# Patient Record
Sex: FEMALE | Race: WHITE | HISPANIC OR LATINO | Employment: PART TIME | URBAN - METROPOLITAN AREA
[De-identification: names, ages, dates, MRNs, and addresses within clinical notes are randomized per-mention and may not be internally consistent; named-entity substitution may affect disease eponyms.]

---

## 2023-03-02 PROBLEM — K58.9 IRRITABLE BOWEL SYNDROME (IBS): Status: ACTIVE | Noted: 2023-03-02

## 2023-03-02 PROBLEM — E55.9 VITAMIN D DEFICIENCY: Status: ACTIVE | Noted: 2023-03-02

## 2023-03-02 PROBLEM — B96.89 BACTERIAL VAGINOSIS: Status: ACTIVE | Noted: 2023-03-02

## 2023-03-02 PROBLEM — F41.9 ANXIETY: Status: ACTIVE | Noted: 2023-03-02

## 2023-03-02 PROBLEM — N76.0 BACTERIAL VAGINOSIS: Status: ACTIVE | Noted: 2023-03-02

## 2023-03-02 PROBLEM — J30.9 ALLERGIC RHINITIS: Status: ACTIVE | Noted: 2023-03-02

## 2023-03-02 PROBLEM — N39.0 UTI (URINARY TRACT INFECTION): Status: ACTIVE | Noted: 2023-03-02

## 2023-03-02 PROBLEM — K21.9 GERD WITHOUT ESOPHAGITIS: Status: ACTIVE | Noted: 2023-03-02

## 2023-03-02 RX ORDER — MAGNESIUM 250 MG
1 TABLET ORAL DAILY
COMMUNITY
Start: 2020-12-04 | End: 2023-10-05

## 2023-03-02 RX ORDER — FAMOTIDINE 20 MG/1
1 TABLET, FILM COATED ORAL EVERY 12 HOURS
COMMUNITY
Start: 2021-12-09 | End: 2023-10-05

## 2023-03-02 RX ORDER — PAROXETINE 10 MG/1
1 TABLET, FILM COATED ORAL DAILY
COMMUNITY
Start: 2022-02-01 | End: 2023-03-08 | Stop reason: SINTOL

## 2023-03-06 ENCOUNTER — APPOINTMENT (OUTPATIENT)
Dept: PRIMARY CARE | Facility: CLINIC | Age: 28
End: 2023-03-06
Payer: COMMERCIAL

## 2023-03-08 ENCOUNTER — OFFICE VISIT (OUTPATIENT)
Dept: PRIMARY CARE | Facility: CLINIC | Age: 28
End: 2023-03-08
Payer: COMMERCIAL

## 2023-03-08 VITALS
DIASTOLIC BLOOD PRESSURE: 85 MMHG | SYSTOLIC BLOOD PRESSURE: 121 MMHG | WEIGHT: 260 LBS | OXYGEN SATURATION: 98 % | HEIGHT: 68 IN | BODY MASS INDEX: 39.4 KG/M2 | HEART RATE: 73 BPM

## 2023-03-08 DIAGNOSIS — F41.1 GENERALIZED ANXIETY DISORDER: Primary | ICD-10-CM

## 2023-03-08 DIAGNOSIS — E66.9 CLASS 2 OBESITY WITHOUT SERIOUS COMORBIDITY WITH BODY MASS INDEX (BMI) OF 39.0 TO 39.9 IN ADULT, UNSPECIFIED OBESITY TYPE: ICD-10-CM

## 2023-03-08 DIAGNOSIS — R63.5 WEIGHT GAIN: ICD-10-CM

## 2023-03-08 PROBLEM — N76.0 BACTERIAL VAGINOSIS: Status: RESOLVED | Noted: 2023-03-02 | Resolved: 2023-03-08

## 2023-03-08 PROBLEM — B96.89 BACTERIAL VAGINOSIS: Status: RESOLVED | Noted: 2023-03-02 | Resolved: 2023-03-08

## 2023-03-08 PROBLEM — N39.0 UTI (URINARY TRACT INFECTION): Status: RESOLVED | Noted: 2023-03-02 | Resolved: 2023-03-08

## 2023-03-08 PROCEDURE — 1036F TOBACCO NON-USER: CPT | Performed by: FAMILY MEDICINE

## 2023-03-08 PROCEDURE — 99214 OFFICE O/P EST MOD 30 MIN: CPT | Performed by: FAMILY MEDICINE

## 2023-03-08 PROCEDURE — 3008F BODY MASS INDEX DOCD: CPT | Performed by: FAMILY MEDICINE

## 2023-03-08 RX ORDER — FLUOXETINE 10 MG/1
10 CAPSULE ORAL DAILY
Qty: 30 CAPSULE | Refills: 1 | Status: SHIPPED | OUTPATIENT
Start: 2023-03-08 | End: 2023-05-01 | Stop reason: SDUPTHER

## 2023-03-08 ASSESSMENT — ENCOUNTER SYMPTOMS
FATIGUE: 0
SHORTNESS OF BREATH: 0
PALPITATIONS: 0
UNEXPECTED WEIGHT CHANGE: 1
ABDOMINAL PAIN: 0
NERVOUS/ANXIOUS: 1
SLEEP DISTURBANCE: 0
COUGH: 0

## 2023-03-08 NOTE — ASSESSMENT & PLAN NOTE
Will try switching the paroxetine to Prozac to see if that helps with her weight.  We can also use Wellbutrin or topiramate to help with appetite.  F/U 1 month for recheck.

## 2023-03-08 NOTE — PROGRESS NOTES
"Subjective   Patient ID: Ofe Tee is a 27 y.o. female who presents for Weight Check (Discuss weight gain and anxiety medication ).    Ofe is here because she is having trouble losing weight.  She is very active and has been attentive to her diet and still can't lose weight.  She feels it may be partly due to the medication she is taking.  She tried stopping the paroxetine and her anxiety got worse.  She resumed it and the symptoms got better again.  She has two small children that keep her very busy and she works full time as a .        Review of Systems   Constitutional:  Positive for unexpected weight change. Negative for fatigue.   Respiratory:  Negative for cough and shortness of breath.    Cardiovascular:  Negative for chest pain and palpitations.   Gastrointestinal:  Negative for abdominal pain.   Psychiatric/Behavioral:  Negative for sleep disturbance. The patient is nervous/anxious.        Objective     /85   Pulse 73   Ht 1.727 m (5' 8\")   Wt 118 kg (260 lb)   SpO2 98%   BMI 39.53 kg/m²     Physical Exam  Constitutional:       General: She is not in acute distress.     Appearance: She is obese.   Neck:      Thyroid: No thyromegaly.   Cardiovascular:      Rate and Rhythm: Normal rate and regular rhythm.      Heart sounds: No murmur heard.  Pulmonary:      Effort: No respiratory distress.      Breath sounds: Normal breath sounds.   Lymphadenopathy:      Cervical: No cervical adenopathy.   Neurological:      Mental Status: She is alert.   Psychiatric:         Mood and Affect: Mood normal.         Behavior: Behavior normal.             Assessment/Plan   Problem List Items Addressed This Visit          Other    Anxiety disorder - Primary    Current Assessment & Plan     Will try switching the paroxetine to Prozac to see if that helps with her weight.  We can also use Wellbutrin or topiramate to help with appetite.  F/U 1 month for recheck.         Relevant Medications    " FLUoxetine (PROzac) 10 mg capsule     Other Visit Diagnoses       Weight gain        Relevant Orders    TSH with reflex to Free T4 if abnormal    Comprehensive metabolic panel    Lipid Panel    Class 2 obesity without serious comorbidity with body mass index (BMI) of 39.0 to 39.9 in adult, unspecified obesity type

## 2023-03-09 ENCOUNTER — LAB (OUTPATIENT)
Dept: LAB | Facility: LAB | Age: 28
End: 2023-03-09
Payer: MEDICAID

## 2023-03-09 DIAGNOSIS — R63.5 WEIGHT GAIN: ICD-10-CM

## 2023-03-09 LAB
ALANINE AMINOTRANSFERASE (SGPT) (U/L) IN SER/PLAS: 29 U/L (ref 7–45)
ALBUMIN (G/DL) IN SER/PLAS: 3.9 G/DL (ref 3.4–5)
ALKALINE PHOSPHATASE (U/L) IN SER/PLAS: 40 U/L (ref 33–110)
ANION GAP IN SER/PLAS: 9 MMOL/L (ref 10–20)
ASPARTATE AMINOTRANSFERASE (SGOT) (U/L) IN SER/PLAS: 22 U/L (ref 9–39)
BILIRUBIN TOTAL (MG/DL) IN SER/PLAS: 0.3 MG/DL (ref 0–1.2)
CALCIUM (MG/DL) IN SER/PLAS: 9.2 MG/DL (ref 8.6–10.6)
CARBON DIOXIDE, TOTAL (MMOL/L) IN SER/PLAS: 30 MMOL/L (ref 21–32)
CHLORIDE (MMOL/L) IN SER/PLAS: 106 MMOL/L (ref 98–107)
CHOLESTEROL (MG/DL) IN SER/PLAS: 155 MG/DL (ref 0–199)
CHOLESTEROL IN HDL (MG/DL) IN SER/PLAS: 39.7 MG/DL
CHOLESTEROL/HDL RATIO: 3.9
CREATININE (MG/DL) IN SER/PLAS: 0.51 MG/DL (ref 0.5–1.05)
GFR FEMALE: >90 ML/MIN/1.73M2
GLUCOSE (MG/DL) IN SER/PLAS: 86 MG/DL (ref 74–99)
LDL: 95 MG/DL (ref 0–99)
POTASSIUM (MMOL/L) IN SER/PLAS: 4.4 MMOL/L (ref 3.5–5.3)
PROTEIN TOTAL: 6.5 G/DL (ref 6.4–8.2)
SODIUM (MMOL/L) IN SER/PLAS: 141 MMOL/L (ref 136–145)
THYROTROPIN (MIU/L) IN SER/PLAS BY DETECTION LIMIT <= 0.05 MIU/L: 1.37 MIU/L (ref 0.44–3.98)
TRIGLYCERIDE (MG/DL) IN SER/PLAS: 101 MG/DL (ref 0–149)
UREA NITROGEN (MG/DL) IN SER/PLAS: 13 MG/DL (ref 6–23)
VLDL: 20 MG/DL (ref 0–40)

## 2023-03-09 PROCEDURE — 36415 COLL VENOUS BLD VENIPUNCTURE: CPT

## 2023-03-09 PROCEDURE — 84443 ASSAY THYROID STIM HORMONE: CPT

## 2023-03-09 PROCEDURE — 80061 LIPID PANEL: CPT

## 2023-03-09 PROCEDURE — 80053 COMPREHEN METABOLIC PANEL: CPT

## 2023-04-18 ENCOUNTER — APPOINTMENT (OUTPATIENT)
Dept: PRIMARY CARE | Facility: CLINIC | Age: 28
End: 2023-04-18
Payer: COMMERCIAL

## 2023-05-01 ENCOUNTER — TELEPHONE (OUTPATIENT)
Dept: PRIMARY CARE | Facility: CLINIC | Age: 28
End: 2023-05-01
Payer: COMMERCIAL

## 2023-05-01 DIAGNOSIS — F41.1 GENERALIZED ANXIETY DISORDER: ICD-10-CM

## 2023-05-01 RX ORDER — FLUOXETINE 10 MG/1
10 CAPSULE ORAL DAILY
Qty: 30 CAPSULE | Refills: 0 | Status: SHIPPED | OUTPATIENT
Start: 2023-05-01 | End: 2023-05-08 | Stop reason: SDUPTHER

## 2023-05-01 NOTE — TELEPHONE ENCOUNTER
Patient has appointment scheduled for 5.04.23, but is out of Fluoxetine 10 MG. Can she have a 1 month supply sent to [facility] in [place]?

## 2023-05-04 ENCOUNTER — APPOINTMENT (OUTPATIENT)
Dept: PRIMARY CARE | Facility: CLINIC | Age: 28
End: 2023-05-04
Payer: COMMERCIAL

## 2023-05-08 ENCOUNTER — OFFICE VISIT (OUTPATIENT)
Dept: PRIMARY CARE | Facility: CLINIC | Age: 28
End: 2023-05-08
Payer: COMMERCIAL

## 2023-05-08 VITALS
TEMPERATURE: 95.9 F | WEIGHT: 263 LBS | BODY MASS INDEX: 39.86 KG/M2 | HEIGHT: 68 IN | HEART RATE: 80 BPM | SYSTOLIC BLOOD PRESSURE: 120 MMHG | OXYGEN SATURATION: 97 % | DIASTOLIC BLOOD PRESSURE: 79 MMHG

## 2023-05-08 DIAGNOSIS — F41.1 GENERALIZED ANXIETY DISORDER: Primary | ICD-10-CM

## 2023-05-08 DIAGNOSIS — E66.9 CLASS 2 OBESITY WITHOUT SERIOUS COMORBIDITY WITH BODY MASS INDEX (BMI) OF 39.0 TO 39.9 IN ADULT, UNSPECIFIED OBESITY TYPE: ICD-10-CM

## 2023-05-08 PROBLEM — E66.812 CLASS 2 OBESITY WITHOUT SERIOUS COMORBIDITY WITH BODY MASS INDEX (BMI) OF 39.0 TO 39.9 IN ADULT: Status: ACTIVE | Noted: 2023-05-08

## 2023-05-08 PROCEDURE — 99213 OFFICE O/P EST LOW 20 MIN: CPT | Performed by: FAMILY MEDICINE

## 2023-05-08 PROCEDURE — 3008F BODY MASS INDEX DOCD: CPT | Performed by: FAMILY MEDICINE

## 2023-05-08 PROCEDURE — 1036F TOBACCO NON-USER: CPT | Performed by: FAMILY MEDICINE

## 2023-05-08 RX ORDER — FLUOXETINE HYDROCHLORIDE 20 MG/1
20 CAPSULE ORAL DAILY
Qty: 30 CAPSULE | Refills: 1 | Status: SHIPPED | OUTPATIENT
Start: 2023-05-08 | End: 2023-06-07 | Stop reason: SDUPTHER

## 2023-05-08 ASSESSMENT — LIFESTYLE VARIABLES
SKIP TO QUESTIONS 9-10: 1
HOW OFTEN DO YOU HAVE A DRINK CONTAINING ALCOHOL: NEVER
HOW MANY STANDARD DRINKS CONTAINING ALCOHOL DO YOU HAVE ON A TYPICAL DAY: PATIENT DOES NOT DRINK
AUDIT-C TOTAL SCORE: 0
HOW OFTEN DO YOU HAVE SIX OR MORE DRINKS ON ONE OCCASION: NEVER

## 2023-05-08 ASSESSMENT — ENCOUNTER SYMPTOMS
ABDOMINAL PAIN: 0
PALPITATIONS: 0
SHORTNESS OF BREATH: 0
UNEXPECTED WEIGHT CHANGE: 0
COUGH: 0
FATIGUE: 0

## 2023-05-08 ASSESSMENT — PATIENT HEALTH QUESTIONNAIRE - PHQ9
1. LITTLE INTEREST OR PLEASURE IN DOING THINGS: NOT AT ALL
SUM OF ALL RESPONSES TO PHQ9 QUESTIONS 1 & 2: 0
2. FEELING DOWN, DEPRESSED OR HOPELESS: NOT AT ALL

## 2023-05-08 NOTE — ASSESSMENT & PLAN NOTE
Recommend she track her calories in [employer] and follow its recommendations for macronutrient and calorie intake.  Continue regular exercise.

## 2023-05-08 NOTE — PROGRESS NOTES
"Subjective   Patient ID: Ofe Tee is a 27 y.o. female who presents for Anxiety.    Ofe is here to follow up on her anxiety.  She reports her anxiety has been controlled, she did run out and her anxiety got worse.  She would be interested in trying a little higher dose.  She sleeps well.  She goes to the gym and walks on the treadmill at home.  She is trying to limit her carbohydrates and occasionally tracks her calories.          Review of Systems   Constitutional:  Negative for fatigue and unexpected weight change.   Respiratory:  Negative for cough and shortness of breath.    Cardiovascular:  Negative for chest pain and palpitations.   Gastrointestinal:  Negative for abdominal pain.       Objective     /79   Pulse 80   Temp 35.5 °C (95.9 °F)   Ht 1.727 m (5' 8\")   Wt 119 kg (263 lb)   SpO2 97%   BMI 39.99 kg/m²     Physical Exam  Constitutional:       General: She is not in acute distress.     Appearance: She is obese.   Neck:      Thyroid: No thyromegaly.   Cardiovascular:      Rate and Rhythm: Normal rate and regular rhythm.      Heart sounds: No murmur heard.  Pulmonary:      Effort: No respiratory distress.      Breath sounds: Normal breath sounds.   Lymphadenopathy:      Cervical: No cervical adenopathy.   Neurological:      Mental Status: She is alert.   Psychiatric:         Mood and Affect: Mood and affect normal.             Assessment/Plan   Problem List Items Addressed This Visit          Endocrine/Metabolic    Class 2 obesity without serious comorbidity with body mass index (BMI) of 39.0 to 39.9 in adult    Current Assessment & Plan     Recommend she track her calories in [employer] and follow its recommendations for macronutrient and calorie intake.  Continue regular exercise.            Other    Generalized anxiety disorder - Primary    Relevant Medications    FLUoxetine (PROzac) 20 mg capsule           "

## 2023-05-30 DIAGNOSIS — F41.1 GENERALIZED ANXIETY DISORDER: ICD-10-CM

## 2023-05-30 RX ORDER — FLUOXETINE HYDROCHLORIDE 20 MG/1
CAPSULE ORAL
Qty: 30 CAPSULE | Refills: 1 | OUTPATIENT
Start: 2023-05-30

## 2023-06-05 ENCOUNTER — APPOINTMENT (OUTPATIENT)
Dept: PRIMARY CARE | Facility: CLINIC | Age: 28
End: 2023-06-05
Payer: COMMERCIAL

## 2023-06-07 ENCOUNTER — OFFICE VISIT (OUTPATIENT)
Dept: PRIMARY CARE | Facility: CLINIC | Age: 28
End: 2023-06-07
Payer: COMMERCIAL

## 2023-06-07 VITALS
OXYGEN SATURATION: 98 % | SYSTOLIC BLOOD PRESSURE: 123 MMHG | WEIGHT: 263 LBS | HEART RATE: 78 BPM | HEIGHT: 68 IN | TEMPERATURE: 98 F | BODY MASS INDEX: 39.86 KG/M2 | DIASTOLIC BLOOD PRESSURE: 85 MMHG

## 2023-06-07 DIAGNOSIS — F41.1 GENERALIZED ANXIETY DISORDER: ICD-10-CM

## 2023-06-07 PROCEDURE — 99213 OFFICE O/P EST LOW 20 MIN: CPT | Performed by: FAMILY MEDICINE

## 2023-06-07 PROCEDURE — 1036F TOBACCO NON-USER: CPT | Performed by: FAMILY MEDICINE

## 2023-06-07 PROCEDURE — 3008F BODY MASS INDEX DOCD: CPT | Performed by: FAMILY MEDICINE

## 2023-06-07 RX ORDER — MULTIVITAMIN
1 TABLET ORAL DAILY
COMMUNITY

## 2023-06-07 RX ORDER — FLUOXETINE HYDROCHLORIDE 20 MG/1
20 CAPSULE ORAL DAILY
Qty: 90 CAPSULE | Refills: 1 | Status: SHIPPED | OUTPATIENT
Start: 2023-06-07 | End: 2023-12-11

## 2023-06-07 ASSESSMENT — ENCOUNTER SYMPTOMS
FATIGUE: 0
PALPITATIONS: 0
SHORTNESS OF BREATH: 0
UNEXPECTED WEIGHT CHANGE: 0
COUGH: 0
ABDOMINAL PAIN: 0

## 2023-06-07 ASSESSMENT — PATIENT HEALTH QUESTIONNAIRE - PHQ9
2. FEELING DOWN, DEPRESSED OR HOPELESS: NOT AT ALL
SUM OF ALL RESPONSES TO PHQ9 QUESTIONS 1 AND 2: 0
1. LITTLE INTEREST OR PLEASURE IN DOING THINGS: NOT AT ALL

## 2023-06-07 NOTE — PROGRESS NOTES
"Subjective   Patient ID: Ofe Tee is a 28 y.o. female who presents for Follow-up (On anxiety ).    Ofe is here to follow up on her anxiety.  She reports her anxiety has been better on the fluoxetine.  She felt a little more anxious when she increased the dose but it is better now.  Sleep is good.          Review of Systems   Constitutional:  Negative for fatigue and unexpected weight change.   Respiratory:  Negative for cough and shortness of breath.    Cardiovascular:  Negative for chest pain and palpitations.   Gastrointestinal:  Negative for abdominal pain.       Objective     /85   Pulse 78   Temp 36.7 °C (98 °F)   Ht 1.727 m (5' 8\")   Wt 119 kg (263 lb)   SpO2 98%   BMI 39.99 kg/m²     Physical Exam  Constitutional:       General: She is not in acute distress.     Appearance: She is obese.   Neck:      Thyroid: No thyromegaly.   Cardiovascular:      Rate and Rhythm: Normal rate and regular rhythm.      Heart sounds: No murmur heard.  Pulmonary:      Effort: No respiratory distress.      Breath sounds: Normal breath sounds.   Lymphadenopathy:      Cervical: No cervical adenopathy.   Neurological:      Mental Status: She is alert.   Psychiatric:         Mood and Affect: Mood and affect normal.             Assessment/Plan   Problem List Items Addressed This Visit          Other    Generalized anxiety disorder    Current Assessment & Plan     She is doing well, will continue the same medication.  F/U as needed.         Relevant Medications    FLUoxetine (PROzac) 20 mg capsule           "

## 2023-08-22 LAB
CLUE CELLS: ABNORMAL
NUGENT SCORE: 5
VAGINITIS-BV + YEAST INTERPRETATION: ABNORMAL
YEAST: ABNORMAL

## 2023-08-23 LAB — URINE CULTURE: NORMAL

## 2023-10-05 ENCOUNTER — INITIAL PRENATAL (OUTPATIENT)
Dept: OBSTETRICS AND GYNECOLOGY | Facility: CLINIC | Age: 28
End: 2023-10-05
Payer: COMMERCIAL

## 2023-10-05 VITALS — SYSTOLIC BLOOD PRESSURE: 110 MMHG | DIASTOLIC BLOOD PRESSURE: 76 MMHG | BODY MASS INDEX: 38.62 KG/M2 | WEIGHT: 254 LBS

## 2023-10-05 DIAGNOSIS — N91.2 AMENORRHEA: Primary | ICD-10-CM

## 2023-10-05 PROCEDURE — 87086 URINE CULTURE/COLONY COUNT: CPT

## 2023-10-05 PROCEDURE — 87491 CHLMYD TRACH DNA AMP PROBE: CPT

## 2023-10-05 PROCEDURE — 99213 OFFICE O/P EST LOW 20 MIN: CPT | Performed by: OBSTETRICS & GYNECOLOGY

## 2023-10-05 PROCEDURE — 87591 N.GONORRHOEAE DNA AMP PROB: CPT

## 2023-10-05 NOTE — PROGRESS NOTES
Subjective   Patient ID [id number]   Ofe Tee is a 28 y.o.  at 9w2d with a working estimated date of delivery of 2024, by Last Menstrual Period who presents for an initial prenatal visit. This pregnancy is unplanned.    Her pregnancy is complicated by:  Mild nausea    OB History    Para Term  AB Living   3 2 1 1   2   SAB IAB Ectopic Multiple Live Births           2      # Outcome Date GA Lbr [place]/2nd Weight Sex Delivery Anes PTL Lv   3 Current            2 Term 10/01/21   2835 g F Vag-Spont EPI N FELIX   1  04/15/20 36w6d  2750 g M Vag-Spont EPI Y FELIX      Birth Comments: PPROM, spontaneous labor.      Complications: History of  premature rupture of membranes (PPROM)          Objective   Physical Exam  Weight: 115 kg (254 lb)  Expected Total Weight Gain: Could not be calculated   Pregravid BMI: Could not be calculated  BP: 110/76    Fetal Heart Rate: 160 (approx. by u/s)   Office ultrasound notes a single, viable intrauterine pregnancy.  Crown-rump length is consistent with stated LMP.  Heart rate approximately 160 bpm noted.  OBGyn Exam    Prenatal Labs  ordered    Assessment/Plan     Pt with anxiety, feels well on fluoxetine 20 mg daily and plans to continue.  Immunizations: n/a  Prenatal Labs ordered  Daily prenatal vitamins prescribed  First trimester screening and second trimester screening discussed. Patient decided to proceed with Prequel after 10 weeks.  Follow up in 4 weeks for return OB visit.

## 2023-10-06 LAB
C TRACH RRNA SPEC QL NAA+PROBE: NEGATIVE
N GONORRHOEA DNA SPEC QL PROBE+SIG AMP: NEGATIVE

## 2023-10-07 LAB — BACTERIA UR CULT: NORMAL

## 2023-10-12 ENCOUNTER — LAB (OUTPATIENT)
Dept: LAB | Facility: LAB | Age: 28
End: 2023-10-12
Payer: COMMERCIAL

## 2023-10-12 DIAGNOSIS — N91.2 AMENORRHEA: ICD-10-CM

## 2023-10-12 LAB
25(OH)D3 SERPL-MCNC: 31 NG/ML (ref 30–100)
ABO GROUP (TYPE) IN BLOOD: NORMAL
ANTIBODY SCREEN: NORMAL
ERYTHROCYTE [DISTWIDTH] IN BLOOD BY AUTOMATED COUNT: 12.8 % (ref 11.5–14.5)
EST. AVERAGE GLUCOSE BLD GHB EST-MCNC: 97 MG/DL
HBA1C MFR BLD: 5 %
HBV SURFACE AG SERPL QL IA: NONREACTIVE
HCT VFR BLD AUTO: 37.7 % (ref 36–46)
HCV AB SER QL: NONREACTIVE
HGB BLD-MCNC: 12.3 G/DL (ref 12–16)
HIV 1+2 AB+HIV1 P24 AG SERPL QL IA: NONREACTIVE
MCH RBC QN AUTO: 29.9 PG (ref 26–34)
MCHC RBC AUTO-ENTMCNC: 32.6 G/DL (ref 32–36)
MCV RBC AUTO: 92 FL (ref 80–100)
NRBC BLD-RTO: 0 /100 WBCS (ref 0–0)
PLATELET # BLD AUTO: 232 X10*3/UL (ref 150–450)
PMV BLD AUTO: 10.7 FL (ref 7.5–11.5)
RBC # BLD AUTO: 4.12 X10*6/UL (ref 4–5.2)
RH FACTOR (ANTIGEN D): NORMAL
RUBV IGG SERPL IA-ACNC: 1.1 IA
RUBV IGG SERPL QL IA: POSITIVE
WBC # BLD AUTO: 5.2 X10*3/UL (ref 4.4–11.3)

## 2023-10-12 PROCEDURE — 85027 COMPLETE CBC AUTOMATED: CPT

## 2023-10-12 PROCEDURE — 87340 HEPATITIS B SURFACE AG IA: CPT

## 2023-10-12 PROCEDURE — 86901 BLOOD TYPING SEROLOGIC RH(D): CPT

## 2023-10-12 PROCEDURE — 86900 BLOOD TYPING SEROLOGIC ABO: CPT

## 2023-10-12 PROCEDURE — 82306 VITAMIN D 25 HYDROXY: CPT

## 2023-10-12 PROCEDURE — 86803 HEPATITIS C AB TEST: CPT

## 2023-10-12 PROCEDURE — 83036 HEMOGLOBIN GLYCOSYLATED A1C: CPT

## 2023-10-12 PROCEDURE — 86317 IMMUNOASSAY INFECTIOUS AGENT: CPT

## 2023-10-12 PROCEDURE — 87389 HIV-1 AG W/HIV-1&-2 AB AG IA: CPT

## 2023-10-12 PROCEDURE — 36415 COLL VENOUS BLD VENIPUNCTURE: CPT

## 2023-10-12 PROCEDURE — 86850 RBC ANTIBODY SCREEN: CPT

## 2023-10-26 LAB
ABO GROUP (TYPE) IN BLOOD: NORMAL
RH FACTOR (ANTIGEN D): NORMAL

## 2023-10-31 ENCOUNTER — TELEPHONE (OUTPATIENT)
Dept: OBSTETRICS AND GYNECOLOGY | Facility: CLINIC | Age: 28
End: 2023-10-31
Payer: COMMERCIAL

## 2023-10-31 DIAGNOSIS — R39.9 UTI SYMPTOMS: Primary | ICD-10-CM

## 2023-10-31 RX ORDER — NITROFURANTOIN 25; 75 MG/1; MG/1
100 CAPSULE ORAL 2 TIMES DAILY
Qty: 14 CAPSULE | Refills: 0 | Status: SHIPPED | OUTPATIENT
Start: 2023-10-31 | End: 2023-11-07

## 2023-10-31 NOTE — TELEPHONE ENCOUNTER
Talked to the patient.  She is 13 weeks pregnant.  She has urinary urgency and burning.  She does feel like her symptoms are consistent with a UTI.  I ordered nitrofurantoin to her pharmacy.  She will call me if her symptoms or not improving in 24 to 36 hours.

## 2023-11-03 ENCOUNTER — ANCILLARY PROCEDURE (OUTPATIENT)
Dept: RADIOLOGY | Facility: CLINIC | Age: 28
End: 2023-11-03
Payer: COMMERCIAL

## 2023-11-03 DIAGNOSIS — Z34.90 PREGNANT (HHS-HCC): ICD-10-CM

## 2023-11-03 PROCEDURE — 76801 OB US < 14 WKS SINGLE FETUS: CPT

## 2023-11-03 PROCEDURE — 76813 OB US NUCHAL MEAS 1 GEST: CPT

## 2023-11-03 PROCEDURE — 76813 OB US NUCHAL MEAS 1 GEST: CPT | Performed by: STUDENT IN AN ORGANIZED HEALTH CARE EDUCATION/TRAINING PROGRAM

## 2023-11-03 PROCEDURE — 76801 OB US < 14 WKS SINGLE FETUS: CPT | Performed by: STUDENT IN AN ORGANIZED HEALTH CARE EDUCATION/TRAINING PROGRAM

## 2023-11-22 ENCOUNTER — APPOINTMENT (OUTPATIENT)
Dept: OBSTETRICS AND GYNECOLOGY | Facility: CLINIC | Age: 28
End: 2023-11-22
Payer: COMMERCIAL

## 2023-12-01 ENCOUNTER — APPOINTMENT (OUTPATIENT)
Dept: OBSTETRICS AND GYNECOLOGY | Facility: CLINIC | Age: 28
End: 2023-12-01
Payer: COMMERCIAL

## 2023-12-04 ENCOUNTER — ROUTINE PRENATAL (OUTPATIENT)
Dept: OBSTETRICS AND GYNECOLOGY | Facility: CLINIC | Age: 28
End: 2023-12-04
Payer: COMMERCIAL

## 2023-12-04 VITALS — BODY MASS INDEX: 38.47 KG/M2 | WEIGHT: 253 LBS | DIASTOLIC BLOOD PRESSURE: 67 MMHG | SYSTOLIC BLOOD PRESSURE: 123 MMHG

## 2023-12-04 DIAGNOSIS — Z34.92 SECOND TRIMESTER PREGNANCY (HHS-HCC): Primary | ICD-10-CM

## 2023-12-04 PROCEDURE — 0501F PRENATAL FLOW SHEET: CPT | Performed by: OBSTETRICS & GYNECOLOGY

## 2023-12-04 NOTE — PROGRESS NOTES
"Subjective   Patient ID [id number]   Ofe Tee is a 28 y.o.  at 17w6d with a working estimated date of delivery of 2024, by Last Menstrual Period who presents for a routine prenatal visit. She denies vaginal bleeding, leakage of fluid, decreased fetal movements, or contractions.    Her pregnancy is complicated by:  Anxiety, on fluoxetine.  History of vaginal delivery at 36.6 weeks after P PROM and spontaneous labor in 2020.  Followed by a term uncomplicated delivery in 2021.    Objective   Physical Exam NWJ=092 bpm.  Weight: 115 kg (253 lb)  Expected Total Weight Gain: Could not be calculated   Pregravid BMI: Could not be calculated  BP: 123/67         Prenatal Labs  Urine dip:  Lab Results   Component Value Date    KETONESU NEGATIVE 2021       Lab Results   Component Value Date    HGB 12.3 10/12/2023    HCT 37.7 10/12/2023    ABO O 10/12/2023    ABO O 10/12/2023    HEPBSAG Nonreactive 10/12/2023     No results found for: \"PAPPA\", \"AFP\", \"HCG\", \"ESTRIOL\", \"INHBA\"  No results found for: \"GLUF\", \"GLUT1\", \"HBVUBLP9LR\", \"RNCYRDX9EN\"    Imaging  The most recent ultrasound was performed on 11/3/23  with a study GA of 13.3  and EFW of 24 .          Assessment/Plan       Continue prenatal vitamin.  Labs reviewed.  Rhogam none  GTT at 28 weeks.  28-year-old at 17.6 weeks.  Her prequel was normal boy.  She does note that she is still quite queasy.  She has good fetal movements.  She has anatomy ultrasound scheduled for .  Follow up in 4 weeks for a routine prenatal visit.  "

## 2023-12-10 DIAGNOSIS — F41.1 GENERALIZED ANXIETY DISORDER: ICD-10-CM

## 2023-12-11 RX ORDER — FLUOXETINE HYDROCHLORIDE 20 MG/1
20 CAPSULE ORAL DAILY
Qty: 90 CAPSULE | Refills: 1 | Status: SHIPPED | OUTPATIENT
Start: 2023-12-11 | End: 2024-06-08

## 2023-12-18 ENCOUNTER — ANCILLARY PROCEDURE (OUTPATIENT)
Dept: RADIOLOGY | Facility: CLINIC | Age: 28
End: 2023-12-18
Payer: COMMERCIAL

## 2023-12-18 DIAGNOSIS — Z36.89 SCREENING, ANTENATAL, FOR FETAL ANATOMIC SURVEY (HHS-HCC): ICD-10-CM

## 2023-12-18 PROCEDURE — 76811 OB US DETAILED SNGL FETUS: CPT | Performed by: OBSTETRICS & GYNECOLOGY

## 2023-12-18 PROCEDURE — 76811 OB US DETAILED SNGL FETUS: CPT

## 2023-12-19 ENCOUNTER — TELEPHONE (OUTPATIENT)
Dept: OBSTETRICS AND GYNECOLOGY | Facility: HOSPITAL | Age: 28
End: 2023-12-19
Payer: COMMERCIAL

## 2023-12-26 ENCOUNTER — TELEPHONE (OUTPATIENT)
Dept: OBSTETRICS AND GYNECOLOGY | Facility: HOSPITAL | Age: 28
End: 2023-12-26
Payer: COMMERCIAL

## 2024-01-02 ENCOUNTER — APPOINTMENT (OUTPATIENT)
Dept: OBSTETRICS AND GYNECOLOGY | Facility: CLINIC | Age: 29
End: 2024-01-02
Payer: COMMERCIAL

## 2024-01-08 ENCOUNTER — APPOINTMENT (OUTPATIENT)
Dept: RADIOLOGY | Facility: CLINIC | Age: 29
End: 2024-01-08
Payer: COMMERCIAL

## 2024-01-09 ENCOUNTER — TELEPHONE (OUTPATIENT)
Dept: OBSTETRICS AND GYNECOLOGY | Facility: HOSPITAL | Age: 29
End: 2024-01-09
Payer: COMMERCIAL

## 2024-01-10 ENCOUNTER — ANCILLARY PROCEDURE (OUTPATIENT)
Dept: RADIOLOGY | Facility: CLINIC | Age: 29
End: 2024-01-10
Payer: COMMERCIAL

## 2024-01-10 DIAGNOSIS — Z34.90 PREGNANT (HHS-HCC): ICD-10-CM

## 2024-01-10 PROCEDURE — 76816 OB US FOLLOW-UP PER FETUS: CPT | Performed by: OBSTETRICS & GYNECOLOGY

## 2024-01-10 PROCEDURE — 76816 OB US FOLLOW-UP PER FETUS: CPT

## 2024-02-14 ENCOUNTER — ROUTINE PRENATAL (OUTPATIENT)
Dept: OBSTETRICS AND GYNECOLOGY | Facility: CLINIC | Age: 29
End: 2024-02-14
Payer: COMMERCIAL

## 2024-02-14 VITALS — DIASTOLIC BLOOD PRESSURE: 70 MMHG | BODY MASS INDEX: 39.68 KG/M2 | SYSTOLIC BLOOD PRESSURE: 122 MMHG | WEIGHT: 261 LBS

## 2024-02-14 DIAGNOSIS — Z3A.28 28 WEEKS GESTATION OF PREGNANCY (HHS-HCC): Primary | ICD-10-CM

## 2024-02-14 DIAGNOSIS — R39.9 UTI SYMPTOMS: ICD-10-CM

## 2024-02-14 DIAGNOSIS — Z34.93 THIRD TRIMESTER PREGNANCY (HHS-HCC): ICD-10-CM

## 2024-02-14 PROCEDURE — 87086 URINE CULTURE/COLONY COUNT: CPT

## 2024-02-14 PROCEDURE — 0501F PRENATAL FLOW SHEET: CPT | Performed by: OBSTETRICS & GYNECOLOGY

## 2024-02-14 RX ORDER — NITROFURANTOIN 25; 75 MG/1; MG/1
100 CAPSULE ORAL 2 TIMES DAILY
Qty: 14 CAPSULE | Refills: 0 | Status: SHIPPED | OUTPATIENT
Start: 2024-02-14 | End: 2024-02-21

## 2024-02-14 NOTE — PROGRESS NOTES
"Subjective   Patient ID [id number]   Ofe Tee is a 28 y.o.  at 28w1d with a working estimated date of delivery of 2024, by Last Menstrual Period who presents for a routine prenatal visit. She denies vaginal bleeding, leakage of fluid, decreased fetal movements, or contractions.    Her last visit here was in 2023 at 17.6 weeks.  She comes today with a concern of \"pressure\".  There may be dysuria.  The office urinalysis does note leukocytes and blood in the urine.  She denies vaginal bleeding or rain hematuria.  She has no fevers or chills.   Her pregnancy is complicated by:  H/O PPROM at 36.6 weeks 2020.  Needs fetal ECHO due to poor resolution.  Objective   Physical Exam: VE= posterior/closed/ long/-3  Weight: 118 kg (261 lb)  Expected Total Weight Gain: Could not be calculated   Pregravid BMI: Could not be calculated  BP: 122/70       UA all neg except leuks 1+, trace blood.           Prenatal Labs  Urine Dip:  Lab Results   Component Value Date    KETONESU NEGATIVE 2021     Lab Results   Component Value Date    HGB 12.3 10/12/2023    HCT 37.7 10/12/2023    ABO O 10/12/2023    ABO O 10/12/2023    HEPBSAG Nonreactive 10/12/2023     No results found for: \"PAPPA\", \"AFP\", \"HCG\", \"ESTRIOL\", \"INHBA\"  No results found for: \"GLUF\", \"GLUT1\", \"CHYJKHO5AE\", \"[id number]\"    Imaging  The most recent ultrasound was performed on 1/10/24  with a study GA of 23.1  and EFW of 61%tile. .          Assessment/Plan     Continue prenatal vitamin.  Labs reviewed.  There is no evidence of labor, the cervix is closed.  The office urinalysis is suspicious for UTI, and urine culture was sent.  I recommend beginning Macrobid empirically and this was ordered.  We did discuss obtaining 28-week labs to include Glucola, CBC, vitamin D, TSH and syphilis as soon as possible.    She is also due for a fetal ECHO, and I recommend calling [phone] to schedule.  GBS later.  Expected mode of delivery   Follow up in " 2 weeks for a routine prenatal visit.

## 2024-02-15 LAB — BACTERIA UR CULT: NORMAL

## 2024-02-16 ENCOUNTER — APPOINTMENT (OUTPATIENT)
Dept: OBSTETRICS AND GYNECOLOGY | Facility: CLINIC | Age: 29
End: 2024-02-16
Payer: COMMERCIAL

## 2024-02-19 ENCOUNTER — LAB (OUTPATIENT)
Dept: LAB | Facility: LAB | Age: 29
End: 2024-02-19
Payer: COMMERCIAL

## 2024-02-19 DIAGNOSIS — O99.810 ABNORMAL GLUCOSE TOLERANCE IN PREGNANCY (HHS-HCC): Primary | ICD-10-CM

## 2024-02-19 DIAGNOSIS — Z3A.28 28 WEEKS GESTATION OF PREGNANCY (HHS-HCC): ICD-10-CM

## 2024-02-19 LAB
25(OH)D3 SERPL-MCNC: 32 NG/ML (ref 30–100)
ERYTHROCYTE [DISTWIDTH] IN BLOOD BY AUTOMATED COUNT: 13.2 % (ref 11.5–14.5)
GLUCOSE 1H P 50 G GLC PO SERPL-MCNC: 147 MG/DL
HCT VFR BLD AUTO: 35.4 % (ref 36–46)
HGB BLD-MCNC: 11.5 G/DL (ref 12–16)
MCH RBC QN AUTO: 30.3 PG (ref 26–34)
MCHC RBC AUTO-ENTMCNC: 32.5 G/DL (ref 32–36)
MCV RBC AUTO: 93 FL (ref 80–100)
NRBC BLD-RTO: 0 /100 WBCS (ref 0–0)
PLATELET # BLD AUTO: 208 X10*3/UL (ref 150–450)
RBC # BLD AUTO: 3.8 X10*6/UL (ref 4–5.2)
REFLEX ADDED, ANEMIA PANEL: NORMAL
TREPONEMA PALLIDUM IGG+IGM AB [PRESENCE] IN SERUM OR PLASMA BY IMMUNOASSAY: NONREACTIVE
TSH SERPL-ACNC: 1.52 MIU/L (ref 0.44–3.98)
WBC # BLD AUTO: 9.1 X10*3/UL (ref 4.4–11.3)

## 2024-02-19 PROCEDURE — 85027 COMPLETE CBC AUTOMATED: CPT

## 2024-02-19 PROCEDURE — 84443 ASSAY THYROID STIM HORMONE: CPT

## 2024-02-19 PROCEDURE — 82947 ASSAY GLUCOSE BLOOD QUANT: CPT

## 2024-02-19 PROCEDURE — 36415 COLL VENOUS BLD VENIPUNCTURE: CPT

## 2024-02-19 PROCEDURE — 86780 TREPONEMA PALLIDUM: CPT

## 2024-02-19 PROCEDURE — 82306 VITAMIN D 25 HYDROXY: CPT

## 2024-02-26 ENCOUNTER — LAB (OUTPATIENT)
Dept: LAB | Facility: LAB | Age: 29
End: 2024-02-26
Payer: COMMERCIAL

## 2024-02-26 DIAGNOSIS — O99.810 ABNORMAL GLUCOSE TOLERANCE IN PREGNANCY (HHS-HCC): ICD-10-CM

## 2024-02-26 LAB
GLUCOSE 1H P 100 G GLC PO SERPL-MCNC: 153 MG/DL
GLUCOSE 2H P 100 G GLC PO SERPL-MCNC: 110 MG/DL
GLUCOSE 3H P 100 G GLC PO SERPL-MCNC: 75 MG/DL
GLUCOSE P FAST SERPL-MCNC: 70 MG/DL

## 2024-02-26 PROCEDURE — 82947 ASSAY GLUCOSE BLOOD QUANT: CPT

## 2024-02-26 PROCEDURE — 36415 COLL VENOUS BLD VENIPUNCTURE: CPT

## 2024-02-26 PROCEDURE — 82952 GTT-ADDED SAMPLES: CPT

## 2024-02-26 PROCEDURE — 82950 GLUCOSE TEST: CPT

## 2024-02-28 ENCOUNTER — ROUTINE PRENATAL (OUTPATIENT)
Dept: OBSTETRICS AND GYNECOLOGY | Facility: CLINIC | Age: 29
End: 2024-02-28
Payer: COMMERCIAL

## 2024-02-28 ENCOUNTER — HOSPITAL ENCOUNTER (OUTPATIENT)
Dept: RADIOLOGY | Facility: CLINIC | Age: 29
Discharge: HOME | End: 2024-02-28
Payer: COMMERCIAL

## 2024-02-28 VITALS — DIASTOLIC BLOOD PRESSURE: 70 MMHG | BODY MASS INDEX: 39.99 KG/M2 | WEIGHT: 263 LBS | SYSTOLIC BLOOD PRESSURE: 120 MMHG

## 2024-02-28 DIAGNOSIS — Z34.93 THIRD TRIMESTER PREGNANCY (HHS-HCC): ICD-10-CM

## 2024-02-28 DIAGNOSIS — O99.213 OBESITY COMPLICATING PREGNANCY, THIRD TRIMESTER (HHS-HCC): ICD-10-CM

## 2024-02-28 DIAGNOSIS — N91.2 AMENORRHEA: ICD-10-CM

## 2024-02-28 DIAGNOSIS — Z3A.30 30 WEEKS GESTATION OF PREGNANCY (HHS-HCC): Primary | ICD-10-CM

## 2024-02-28 PROCEDURE — 76819 FETAL BIOPHYS PROFIL W/O NST: CPT

## 2024-02-28 PROCEDURE — 76816 OB US FOLLOW-UP PER FETUS: CPT

## 2024-02-28 PROCEDURE — 76816 OB US FOLLOW-UP PER FETUS: CPT | Performed by: OBSTETRICS & GYNECOLOGY

## 2024-02-28 PROCEDURE — 76819 FETAL BIOPHYS PROFIL W/O NST: CPT | Performed by: OBSTETRICS & GYNECOLOGY

## 2024-02-28 PROCEDURE — 0501F PRENATAL FLOW SHEET: CPT | Performed by: OBSTETRICS & GYNECOLOGY

## 2024-02-28 NOTE — PROGRESS NOTES
"Subjective   Patient ID [id number]   Ofe Tee is a 28 y.o.  at 30w1d with a working estimated date of delivery of 2024, by Last Menstrual Period who presents for a routine prenatal visit. She denies vaginal bleeding, leakage of fluid, decreased fetal movements, or contractions.    Her pregnancy is complicated by:  Elevated 1 hour Glucola (147), normal 3-hour Glucola (70/153/110/75).  S/p UTI  sx on 2023, resolved with Macrobid.    Objective   Physical Exam:   Weight: 119 kg (263 lb)  Expected Total Weight Gain: Could not be calculated   Pregravid BMI: Could not be calculated  BP: 120/70                  Prenatal Labs  Urine Dip:  Lab Results   Component Value Date    KETONESU NEGATIVE 2021     Lab Results   Component Value Date    HGB 11.5 (L) 2024    HCT 35.4 (L) 2024    ABO O 10/12/2023    ABO O 10/12/2023    HEPBSAG Nonreactive 10/12/2023     No results found for: \"PAPPA\", \"AFP\", \"HCG\", \"ESTRIOL\", \"INHBA\"  No results found for: \"GLUF\", \"GLUT1\", \"NKPPSGH5MZ\", \"[id number]\"    Imaging  The most recent ultrasound was performed on24 The most recent ultrasound study is not finalized with a study GA of  30.1The most recent ultrasound study is not finalized and EFW of  50%tile.The most recent ultrasound study is not finalized.  The most recent ultrasound study is not finalized  The most recent ultrasound study is not finalized    Assessment/Plan     Continue prenatal vitamin.  Labs reviewed.  Expected mode of delivery .  Follow up in 2 week sfor a routine prenatal visit.  "

## 2024-03-13 ENCOUNTER — APPOINTMENT (OUTPATIENT)
Dept: OBSTETRICS AND GYNECOLOGY | Facility: CLINIC | Age: 29
End: 2024-03-13
Payer: COMMERCIAL

## 2024-03-15 ENCOUNTER — ROUTINE PRENATAL (OUTPATIENT)
Dept: OBSTETRICS AND GYNECOLOGY | Facility: CLINIC | Age: 29
End: 2024-03-15
Payer: COMMERCIAL

## 2024-03-15 VITALS — SYSTOLIC BLOOD PRESSURE: 110 MMHG | DIASTOLIC BLOOD PRESSURE: 74 MMHG | WEIGHT: 268 LBS | BODY MASS INDEX: 40.75 KG/M2

## 2024-03-15 DIAGNOSIS — Z3A.32 32 WEEKS GESTATION OF PREGNANCY (HHS-HCC): Primary | ICD-10-CM

## 2024-03-15 DIAGNOSIS — Z34.93 THIRD TRIMESTER PREGNANCY (HHS-HCC): ICD-10-CM

## 2024-03-15 PROCEDURE — 87086 URINE CULTURE/COLONY COUNT: CPT

## 2024-03-15 PROCEDURE — 0501F PRENATAL FLOW SHEET: CPT | Performed by: OBSTETRICS & GYNECOLOGY

## 2024-03-15 RX ORDER — FERROUS GLUCONATE 256(28)MG
28 TABLET ORAL DAILY
COMMUNITY

## 2024-03-15 NOTE — PROGRESS NOTES
"Subjective   Patient ID [id number]   Ofe Tee is a 28 y.o.  at 32w3d with a working estimated date of delivery of 2024, by Last Menstrual Period who presents for a routine prenatal visit. She denies vaginal bleeding, leakage of fluid, decreased fetal movements, or contractions.    Her pregnancy is complicated by:  History of P PROM at 36.6 weeks in 2020.  History of UTI 2024.    Objective   Physical Exam:   Weight: 122 kg (268 lb)  Expected Total Weight Gain: Could not be calculated   Pregravid BMI: Could not be calculated  BP: 110/74                  Prenatal Labs  Urine Dip:  Lab Results   Component Value Date    KETONESU NEGATIVE 2021     Lab Results   Component Value Date    HGB 11.5 (L) 2024    HCT 35.4 (L) 2024    ABO O 10/12/2023    ABO O 10/12/2023    HEPBSAG Nonreactive 10/12/2023     No results found for: \"PAPPA\", \"AFP\", \"HCG\", \"ESTRIOL\", \"INHBA\"  No results found for: \"GLUF\", \"GLUT1\", \"JMBJJPZ3RS\", \"VSPXQUR7GU\"    Imaging  The most recent ultrasound was performed on  24The most recent ultrasound study is not finalized with a study GA of  30.1The most recent ultrasound study is not finalized and EFW of  50%tile.The most recent ultrasound study is not finalized.  The most recent ultrasound study is not finalized  The most recent ultrasound study is not finalized    Assessment/Plan     Continue prenatal vitamin.  Check urine culture.  Expected mode of delivery   Follow up in 2 week for a routine prenatal visit.  "

## 2024-03-17 LAB — BACTERIA UR CULT: NORMAL

## 2024-03-27 ENCOUNTER — ROUTINE PRENATAL (OUTPATIENT)
Dept: OBSTETRICS AND GYNECOLOGY | Facility: CLINIC | Age: 29
End: 2024-03-27
Payer: COMMERCIAL

## 2024-03-27 VITALS — BODY MASS INDEX: 40.14 KG/M2 | SYSTOLIC BLOOD PRESSURE: 100 MMHG | WEIGHT: 264 LBS | DIASTOLIC BLOOD PRESSURE: 68 MMHG

## 2024-03-27 DIAGNOSIS — Z34.93 THIRD TRIMESTER PREGNANCY (HHS-HCC): ICD-10-CM

## 2024-03-27 DIAGNOSIS — Z3A.34 34 WEEKS GESTATION OF PREGNANCY (HHS-HCC): Primary | ICD-10-CM

## 2024-03-27 PROCEDURE — 0501F PRENATAL FLOW SHEET: CPT | Performed by: OBSTETRICS & GYNECOLOGY

## 2024-03-27 NOTE — PROGRESS NOTES
"Subjective   Patient ID [id number]   Ofe Tee is a 28 y.o.  at 34w1d with a working estimated date of delivery of 2024, by Last Menstrual Period who presents for a routine prenatal visit. She denies vaginal bleeding, leakage of fluid, decreased fetal movements, or contractions.    Her pregnancy is complicated by:  History of  delivery after P PROM at 36.6 weeks in 2020, followed by 37-week delivery in 2021.  Elevated 1 hour, normal 3-hour Glucola.    Objective   Physical Exam: tpl=003  Weight: 120 kg (264 lb)  Expected Total Weight Gain: Could not be calculated   Pregravid BMI: Could not be calculated  BP: 100/68                  Prenatal Labs  Urine Dip:  Lab Results   Component Value Date    KETONESU NEGATIVE 2021     Lab Results   Component Value Date    HGB 11.5 (L) 2024    HCT 35.4 (L) 2024    ABO O 10/12/2023    ABO O 10/12/2023    HEPBSAG Nonreactive 10/12/2023     No results found for: \"PAPPA\", \"AFP\", \"HCG\", \"ESTRIOL\", \"INHBA\"  No results found for: \"GLUF\", \"GLUT1\", \"PBGUJOD8BQ\", \"[id number]\"    Imaging  The most recent ultrasound was performed on 24The most recent ultrasound study is not finalized with a study GA of 30.1 The most recent ultrasound study is not finalized and EFW of  50%tile.The most recent ultrasound study is not finalized.  The most recent ultrasound study is not finalized  The most recent ultrasound study is not finalized    Assessment/Plan     Continue prenatal vitamin.  Labs reviewed.  Next ultrasound is scheduled April 10, 2024.  GBS next visit..  Expected mode of delivery  at [facility].  Follow up in 1 week for a routine prenatal visit.  "

## 2024-04-03 ENCOUNTER — ROUTINE PRENATAL (OUTPATIENT)
Dept: OBSTETRICS AND GYNECOLOGY | Facility: CLINIC | Age: 29
End: 2024-04-03
Payer: COMMERCIAL

## 2024-04-03 VITALS — DIASTOLIC BLOOD PRESSURE: 70 MMHG | SYSTOLIC BLOOD PRESSURE: 100 MMHG | BODY MASS INDEX: 40.6 KG/M2 | WEIGHT: 267 LBS

## 2024-04-03 DIAGNOSIS — Z34.93 THIRD TRIMESTER PREGNANCY (HHS-HCC): Primary | ICD-10-CM

## 2024-04-03 DIAGNOSIS — Z3A.35 35 WEEKS GESTATION OF PREGNANCY (HHS-HCC): ICD-10-CM

## 2024-04-03 PROCEDURE — 87081 CULTURE SCREEN ONLY: CPT

## 2024-04-03 PROCEDURE — 0501F PRENATAL FLOW SHEET: CPT | Performed by: OBSTETRICS & GYNECOLOGY

## 2024-04-03 NOTE — PROGRESS NOTES
"Subjective   Patient ID [id number]   Ofe Tee is a 28 y.o.  at 35w1d with a working estimated date of delivery of 2024, by Last Menstrual Period who presents for a routine prenatal visit. She denies vaginal bleeding, leakage of fluid, decreased fetal movements, or contractions.    Her pregnancy is complicated by:  Elevated 1 hour, normal 3-hour Glucola.  On fluoxetine for depression/anxiety.  History of P PROM  at 36.6 weeks in 2020.    Objective   Physical Exam: VE /3  Weight: 121 kg (267 lb)  Expected Total Weight Gain: Could not be calculated   Pregravid BMI: Could not be calculated  BP: 100/70                  Prenatal Labs  Urine Dip:  Lab Results   Component Value Date    KETONESU NEGATIVE 2021     Lab Results   Component Value Date    HGB 11.5 (L) 2024    HCT 35.4 (L) 2024    ABO O 10/12/2023    ABO O 10/12/2023    HEPBSAG Nonreactive 10/12/2023     No results found for: \"PAPPA\", \"AFP\", \"HCG\", \"ESTRIOL\", \"INHBA\"  No results found for: \"GLUF\", \"GLUT1\", \"UUYELHK3HI\", \"[id number]\"    Imaging  The most recent ultrasound was performed on  24The most recent ultrasound study is not finalized with a study GA of 30.1 The most recent ultrasound study is not finalized and EFW of  50%tile.The most recent ultrasound study is not finalized.  The most recent ultrasound study is not finalized  The most recent ultrasound study is not finalized    Assessment/Plan     Continue prenatal vitamin.  Labs reviewed.  Next ultrasound scheduled April 10, 2024.  GBS taken today.  Expected mode of delivery  at [facility].  Follow up in 1 week for a routine prenatal visit.  "

## 2024-04-06 LAB — GP B STREP GENITAL QL CULT: NORMAL

## 2024-04-10 ENCOUNTER — HOSPITAL ENCOUNTER (OUTPATIENT)
Dept: RADIOLOGY | Facility: CLINIC | Age: 29
Discharge: HOME | End: 2024-04-10
Payer: COMMERCIAL

## 2024-04-10 ENCOUNTER — ROUTINE PRENATAL (OUTPATIENT)
Dept: OBSTETRICS AND GYNECOLOGY | Facility: CLINIC | Age: 29
End: 2024-04-10
Payer: COMMERCIAL

## 2024-04-10 VITALS — SYSTOLIC BLOOD PRESSURE: 118 MMHG | BODY MASS INDEX: 40.75 KG/M2 | WEIGHT: 268 LBS | DIASTOLIC BLOOD PRESSURE: 70 MMHG

## 2024-04-10 DIAGNOSIS — N91.2 AMENORRHEA: ICD-10-CM

## 2024-04-10 DIAGNOSIS — Z34.93 THIRD TRIMESTER PREGNANCY (HHS-HCC): ICD-10-CM

## 2024-04-10 DIAGNOSIS — Z3A.36 36 WEEKS GESTATION OF PREGNANCY (HHS-HCC): Primary | ICD-10-CM

## 2024-04-10 PROCEDURE — 0501F PRENATAL FLOW SHEET: CPT | Performed by: OBSTETRICS & GYNECOLOGY

## 2024-04-10 NOTE — PROGRESS NOTES
"Subjective   Patient ID [id number]   Ofe Tee is a 28 y.o.  at 36w1d with a working estimated date of delivery of 2024, by Last Menstrual Period who presents for a routine prenatal visit. She denies vaginal bleeding, leakage of fluid, decreased fetal movements, or contractions.    Her pregnancy is complicated by:  History of P PROM at 36.6 weeks in 2020.  Elevated 1 hour, normal 3-hour Glucola, BMI=40.75    Objective   Physical Exam: VE /3  Weight: 122 kg (268 lb)  Expected Total Weight Gain: Could not be calculated   Pregravid BMI: Could not be calculated  BP: 118/70                  Prenatal Labs  Urine Dip:  Lab Results   Component Value Date    KETONESU NEGATIVE 2021     Lab Results   Component Value Date    HGB 11.5 (L) 2024    HCT 35.4 (L) 2024    ABO O 10/12/2023    ABO O 10/12/2023    HEPBSAG Nonreactive 10/12/2023     No results found for: \"PAPPA\", \"AFP\", \"HCG\", \"ESTRIOL\", \"INHBA\"  No results found for: \"GLUF\", \"GLUT1\", \"GXJNHTB4VF\", \"[id number]\"    Imaging  The most recent ultrasound was performed on 24 The most recent ultrasound study is not finalized with a study GA of  30.1The most recent ultrasound study is not finalized and EFW of 61%tile. The most recent ultrasound study is not finalized.  The most recent ultrasound study is not finalized  The most recent ultrasound study is not finalized    Assessment/Plan     Continue prenatal vitamin.  Labs reviewed.  Had ultrasound scheduled today, but was late to [facility] imaging and rescheduled for tomorrow, 2024.  GBS negative (was positive in past).  Expected mode of delivery .  Follow up in 1 week for a routine prenatal visit.  "

## 2024-04-11 ENCOUNTER — APPOINTMENT (OUTPATIENT)
Dept: RADIOLOGY | Facility: CLINIC | Age: 29
End: 2024-04-11
Payer: COMMERCIAL

## 2024-04-17 ENCOUNTER — ROUTINE PRENATAL (OUTPATIENT)
Dept: OBSTETRICS AND GYNECOLOGY | Facility: CLINIC | Age: 29
End: 2024-04-17
Payer: COMMERCIAL

## 2024-04-17 ENCOUNTER — HOSPITAL ENCOUNTER (OUTPATIENT)
Dept: RADIOLOGY | Facility: CLINIC | Age: 29
Discharge: HOME | End: 2024-04-17
Payer: COMMERCIAL

## 2024-04-17 VITALS — WEIGHT: 269 LBS | BODY MASS INDEX: 40.9 KG/M2 | SYSTOLIC BLOOD PRESSURE: 114 MMHG | DIASTOLIC BLOOD PRESSURE: 70 MMHG

## 2024-04-17 DIAGNOSIS — Z3A.37 37 WEEKS GESTATION OF PREGNANCY (HHS-HCC): Primary | ICD-10-CM

## 2024-04-17 DIAGNOSIS — Z34.93 THIRD TRIMESTER PREGNANCY (HHS-HCC): ICD-10-CM

## 2024-04-17 DIAGNOSIS — O99.213 OBESITY COMPLICATING PREGNANCY, THIRD TRIMESTER (HHS-HCC): ICD-10-CM

## 2024-04-17 PROCEDURE — 76819 FETAL BIOPHYS PROFIL W/O NST: CPT

## 2024-04-17 PROCEDURE — 0501F PRENATAL FLOW SHEET: CPT | Performed by: OBSTETRICS & GYNECOLOGY

## 2024-04-17 PROCEDURE — 76816 OB US FOLLOW-UP PER FETUS: CPT | Performed by: OBSTETRICS & GYNECOLOGY

## 2024-04-17 PROCEDURE — 76819 FETAL BIOPHYS PROFIL W/O NST: CPT | Performed by: OBSTETRICS & GYNECOLOGY

## 2024-04-17 PROCEDURE — 76816 OB US FOLLOW-UP PER FETUS: CPT

## 2024-04-17 NOTE — PROGRESS NOTES
"Subjective   Patient ID [id number]   Ofe Tee is a 28 y.o.  at 37w1d with a working estimated date of delivery of 2024, by Last Menstrual Period who presents for a routine prenatal visit. She denies vaginal bleeding, leakage of fluid, decreased fetal movements, or contractions.    Her pregnancy is complicated by:  Ultrasound earlier today with polyhydramnios.  History of P PROM at 36.6 weeks in 2020.  Elevated 1 hour, normal 3-hour Glucola this pregnancy.    Objective   Physical Exam: VE 3/60/-3.  JFC=995  Weight: 122 kg (269 lb)  Expected Total Weight Gain: Could not be calculated   Pregravid BMI: Could not be calculated  BP: 114/70                  Prenatal Labs  Urine Dip:  Lab Results   Component Value Date    KETONESU NEGATIVE 2021     Lab Results   Component Value Date    HGB 11.5 (L) 2024    HCT 35.4 (L) 2024    ABO O 10/12/2023    ABO O 10/12/2023    HEPBSAG Nonreactive 10/12/2023     No results found for: \"PAPPA\", \"AFP\", \"HCG\", \"ESTRIOL\", \"INHBA\"  No results found for: \"GLUF\", \"GLUT1\", \"[id number]\", \"[id number]\"    Imaging  The most recent ultrasound was performed on 24  with a study GA of 37.1  and EFW of 61%tile (AC=88%), polyhydramnios .          Assessment/Plan     Continue prenatal vitamin.  Labs reviewed.  Ultrasound done earlier today with mild polyhydramnios.  She has been feeling some pressure or backache.  Denies regular contractions.  Exam at 3/ 60/-3, ballotable.  Discussed signs and symptoms of labor.  GBS negative.  Expected mode of delivery .  Follow up in 1 week for a routine prenatal visit.  "

## 2024-04-22 ENCOUNTER — HOSPITAL ENCOUNTER (OUTPATIENT)
Facility: HOSPITAL | Age: 29
Discharge: HOME | End: 2024-04-22
Attending: OBSTETRICS & GYNECOLOGY | Admitting: OBSTETRICS & GYNECOLOGY
Payer: COMMERCIAL

## 2024-04-22 VITALS
HEART RATE: 75 BPM | WEIGHT: 270.28 LBS | SYSTOLIC BLOOD PRESSURE: 118 MMHG | BODY MASS INDEX: 42.42 KG/M2 | OXYGEN SATURATION: 97 % | DIASTOLIC BLOOD PRESSURE: 71 MMHG | TEMPERATURE: 96.8 F | HEIGHT: 67 IN | RESPIRATION RATE: 18 BRPM

## 2024-04-22 PROCEDURE — 99213 OFFICE O/P EST LOW 20 MIN: CPT

## 2024-04-22 PROCEDURE — 59025 FETAL NON-STRESS TEST: CPT

## 2024-04-22 PROCEDURE — 59025 FETAL NON-STRESS TEST: CPT | Mod: GC

## 2024-04-22 PROCEDURE — 99214 OFFICE O/P EST MOD 30 MIN: CPT

## 2024-04-22 SDOH — HEALTH STABILITY: MENTAL HEALTH: WERE YOU ABLE TO COMPLETE ALL THE BEHAVIORAL HEALTH SCREENINGS?: YES

## 2024-04-22 SDOH — ECONOMIC STABILITY: HOUSING INSECURITY: DO YOU FEEL UNSAFE GOING BACK TO THE PLACE WHERE YOU ARE LIVING?: NO

## 2024-04-22 SDOH — HEALTH STABILITY: MENTAL HEALTH: WISH TO BE DEAD (PAST 1 MONTH): NO

## 2024-04-22 SDOH — SOCIAL STABILITY: SOCIAL INSECURITY: DOES ANYONE TRY TO KEEP YOU FROM HAVING/CONTACTING OTHER FRIENDS OR DOING THINGS OUTSIDE YOUR HOME?: NO

## 2024-04-22 SDOH — SOCIAL STABILITY: SOCIAL INSECURITY: HAS ANYONE EVER THREATENED TO HURT YOUR FAMILY OR YOUR PETS?: NO

## 2024-04-22 SDOH — SOCIAL STABILITY: SOCIAL INSECURITY: ARE YOU OR HAVE YOU BEEN THREATENED OR ABUSED PHYSICALLY, EMOTIONALLY, OR SEXUALLY BY ANYONE?: NO

## 2024-04-22 SDOH — SOCIAL STABILITY: SOCIAL INSECURITY: VERBAL ABUSE: DENIES

## 2024-04-22 SDOH — SOCIAL STABILITY: SOCIAL INSECURITY: ABUSE SCREEN: ADULT

## 2024-04-22 SDOH — SOCIAL STABILITY: SOCIAL INSECURITY: DO YOU FEEL ANYONE HAS EXPLOITED OR TAKEN ADVANTAGE OF YOU FINANCIALLY OR OF YOUR PERSONAL PROPERTY?: NO

## 2024-04-22 SDOH — SOCIAL STABILITY: SOCIAL INSECURITY: HAVE YOU HAD THOUGHTS OF HARMING ANYONE ELSE?: NO

## 2024-04-22 SDOH — HEALTH STABILITY: MENTAL HEALTH: SUICIDAL BEHAVIOR (LIFETIME): NO

## 2024-04-22 SDOH — HEALTH STABILITY: MENTAL HEALTH: NON-SPECIFIC ACTIVE SUICIDAL THOUGHTS (PAST 1 MONTH): NO

## 2024-04-22 SDOH — SOCIAL STABILITY: SOCIAL INSECURITY: PHYSICAL ABUSE: DENIES

## 2024-04-22 SDOH — SOCIAL STABILITY: SOCIAL INSECURITY: ARE THERE ANY APPARENT SIGNS OF INJURIES/BEHAVIORS THAT COULD BE RELATED TO ABUSE/NEGLECT?: NO

## 2024-04-22 ASSESSMENT — LIFESTYLE VARIABLES
AUDIT-C TOTAL SCORE: 0
HOW MANY STANDARD DRINKS CONTAINING ALCOHOL DO YOU HAVE ON A TYPICAL DAY: PATIENT DOES NOT DRINK
HOW OFTEN DO YOU HAVE 6 OR MORE DRINKS ON ONE OCCASION: NEVER
SKIP TO QUESTIONS 9-10: 1
AUDIT-C TOTAL SCORE: 0
HOW OFTEN DO YOU HAVE A DRINK CONTAINING ALCOHOL: NEVER

## 2024-04-22 ASSESSMENT — PAIN SCALES - GENERAL
PAINLEVEL_OUTOF10: 2
PAINLEVEL_OUTOF10: 2
PAINLEVEL_OUTOF10: 5 - MODERATE PAIN
PAINLEVEL_OUTOF10: 3
PAINLEVEL_OUTOF10: 3
PAINLEVEL_OUTOF10: 2
PAINLEVEL_OUTOF10: 2

## 2024-04-22 NOTE — H&P
Obstetrical Triage History and Physical     Ofe Tee is a 28 y.o. .     Chief Complaint: Contractions    Assessment/Plan    29yo  at 37.6wga presenting for r/o labor.    R/o labor  - SVE: 3/60/-3 -> 4/70/-3 -> 4/70/-3  - ctx q 3-5min  - PO hydration encouraged  - Patient does not feel comfortable going home at this time given quick labor with previous delivery; will plan to recheck again in 2-3 hours    Sign out given to day team.  Seen and discussed with Dr. Mary Bhatia MD PGY-1  Obstetrics & Gynecology    Day team update: Unchanged on repeat SVE, still 4/70/-3 at 0730. Resting comfortably in room and no contractions palpated. She is experiencing mild ctxs every 3-7 minutes. Likely latent labor. Ok for DC to home given no cervical change with 3 exams over 6 hours. Return precautions reviewed.     Pt seen and discussed with Dr. Álvaro Rodríguez PA-C  24 7:52 AM  Vocera    Active Problems:  There are no active Hospital Problems.      Pregnancy Problems (from 10/05/23 to present)       No problems associated with this episode.          Subjective   Ofe is here complaining of q7 min contractions. Good fetal movement. Denies vaginal bleeding. Denies leaking of fluid.      Pregnancy n/f:  - iron deficiency anemia     Obstetrical History   OB History    Para Term  AB Living   3 2 1 1   2   SAB IAB Ectopic Multiple Live Births           2      # Outcome Date GA Lbr Troy/2nd Weight Sex Delivery Anes PTL Lv   3 Current            2 Term 10/01/21   2.835 kg F Vag-Spont EPI N FELIX   1  04/15/20 36w6d  2.75 kg M Vag-Spont EPI Y FELIX      Birth Comments: PPROM, spontaneous labor.      Complications: History of  premature rupture of membranes (PPROM)       Past Medical History  Past Medical History:   Diagnosis Date    Allergy status to unspecified drugs, medicaments and biological substances     History of seasonal allergies    Bacterial vaginosis  03/02/2023    COVID-19 11/02/2020    COVID-19    Personal history of other diseases of the respiratory system 01/15/2016    History of streptococcal pharyngitis        Past Surgical History   No past surgical history on file.    Social History  Social History     Tobacco Use    Smoking status: Never    Smokeless tobacco: Never   Substance Use Topics    Alcohol use: Never     Substance and Sexual Activity   Drug Use Not on file       Allergies  Penicillins     Medications  Medications Prior to Admission   Medication Sig Dispense Refill Last Dose    ferrous gluconate 256 mg (28 mg iron) tablet Take 1 tablet (28 mg) by mouth once daily.       FLUoxetine (PROzac) 20 mg capsule Take 1 capsule (20 mg) by mouth once daily. 90 capsule 1     multivitamin tablet Take 1 tablet by mouth once daily.          Objective    Last Vitals  Temp Pulse Resp BP MAP O2 Sat   36 °C (96.8 °F) 85 18 120/85   97 %     Physical Examination  GENERAL: Examination reveals a well developed, well nourished, gravid female in no acute distress. She is alert and cooperative.  HEENT: External ears normal. Nose normal, no erythema or discharge. Mouth and throat clear.  NECK: supple, no significant adenopathy  LUNGS: Normal respiratory effort  HEART: RRR  ABDOMEN: Gravid  VAGINA: normal appearing vagina with normal color and discharge and no lesions noted  EXTREMITIES: no limitation in range of motion  SKIN: normal coloration and turgor, no rashes  NEUROLOGICAL: alert, oriented, normal speech, no focal findings or movement disorder noted  PSYCHOLOGICAL: awake and alert; oriented to person, place, and time    Non-Stress Test   Baseline Fetal Heart Rate for Non-Stress Test: 140 BPM  Variability in Waveform for Non-Stress Test: Moderate  Accelerations in Non-Stress Test: Yes, lasting at least 15 seconds, greater than/equal to 15 bpm  Decelerations in Non-Stress Test: None  Contractions in Non-Stress Test: Irregular  NST Contraction Frequency: every 3-7  minutes       Cervical Exam  Dilation: 3  Effacement (%): 60  Fetal Station: -3  OB Examiner: Sriram GRECO  Fetal Assessment  Mode: External US  Baseline Fetal Heart Rate (bpm): 150 bpm

## 2024-04-24 ENCOUNTER — ROUTINE PRENATAL (OUTPATIENT)
Dept: OBSTETRICS AND GYNECOLOGY | Facility: CLINIC | Age: 29
End: 2024-04-24
Payer: COMMERCIAL

## 2024-04-24 VITALS — BODY MASS INDEX: 42.13 KG/M2 | WEIGHT: 269 LBS | DIASTOLIC BLOOD PRESSURE: 70 MMHG | SYSTOLIC BLOOD PRESSURE: 118 MMHG

## 2024-04-24 DIAGNOSIS — Z34.93 THIRD TRIMESTER PREGNANCY (HHS-HCC): ICD-10-CM

## 2024-04-24 DIAGNOSIS — Z3A.38 38 WEEKS GESTATION OF PREGNANCY (HHS-HCC): Primary | ICD-10-CM

## 2024-04-24 NOTE — PROGRESS NOTES
"Subjective   Patient ID [id number]   Ofe Tee is a 28 y.o.  at 38w1d with a working estimated date of delivery of 2024, by Last Menstrual Period who presents for a routine prenatal visit. She denies vaginal bleeding, leakage of fluid, decreased fetal movements, or contractions.    Her pregnancy is complicated by:  History of P PROM with delivery at 36.6 weeks in 2020.    Elevated 1 hour, normal 3-hour Glucola.    Was seen in labor and delivery 2024 for rule out labor, home at 4 cm.    Objective   Physical Exam: RBN=752.  VE /-3  Weight: 122 kg (269 lb)  Expected Total Weight Gain: Could not be calculated   Pregravid BMI: Could not be calculated  BP: 118/70                  Prenatal Labs  Urine Dip:  Lab Results   Component Value Date    KETONESU NEGATIVE 2021     Lab Results   Component Value Date    HGB 11.5 (L) 2024    HCT 35.4 (L) 2024    ABO O 10/12/2023    ABO O 10/12/2023    HEPBSAG Nonreactive 10/12/2023     No results found for: \"PAPPA\", \"AFP\", \"HCG\", \"ESTRIOL\", \"INHBA\"  No results found for: \"GLUF\", \"GLUT1\", \"UWRLMFN7GH\", \"ORYCAQT4FO\"    Imaging  The most recent ultrasound was performed on 24  with a study GA of 37.1  and EFW of 61%tile .          Assessment/Plan     Continue prenatal vitamin.  Labs reviewed.  GBS negative.  Expected mode of delivery .  She delivered by 37 weeks with her other 2 pregnancies, we now discussed considering induction labor with Pitocin, AROM at 39.2 weeks.  Follow up in 1 week for a routine prenatal visit.  "

## 2024-04-27 ENCOUNTER — HOSPITAL ENCOUNTER (INPATIENT)
Facility: HOSPITAL | Age: 29
LOS: 2 days | Discharge: HOME | End: 2024-04-29
Attending: OBSTETRICS & GYNECOLOGY | Admitting: OBSTETRICS & GYNECOLOGY
Payer: COMMERCIAL

## 2024-04-27 ENCOUNTER — ANESTHESIA (OUTPATIENT)
Dept: OBSTETRICS AND GYNECOLOGY | Facility: HOSPITAL | Age: 29
End: 2024-04-27
Payer: COMMERCIAL

## 2024-04-27 ENCOUNTER — ANESTHESIA EVENT (OUTPATIENT)
Dept: OBSTETRICS AND GYNECOLOGY | Facility: HOSPITAL | Age: 29
End: 2024-04-27
Payer: COMMERCIAL

## 2024-04-27 PROBLEM — Z37.9 NORMAL LABOR (HHS-HCC): Status: ACTIVE | Noted: 2024-04-27

## 2024-04-27 LAB
ABO GROUP (TYPE) IN BLOOD: NORMAL
ANTIBODY SCREEN: NORMAL
ERYTHROCYTE [DISTWIDTH] IN BLOOD BY AUTOMATED COUNT: 13.2 % (ref 11.5–14.5)
HCT VFR BLD AUTO: 36.9 % (ref 36–46)
HGB BLD-MCNC: 12.6 G/DL (ref 12–16)
MCH RBC QN AUTO: 31 PG (ref 26–34)
MCHC RBC AUTO-ENTMCNC: 34.1 G/DL (ref 32–36)
MCV RBC AUTO: 91 FL (ref 80–100)
NRBC BLD-RTO: 0 /100 WBCS (ref 0–0)
PLATELET # BLD AUTO: 205 X10*3/UL (ref 150–450)
RBC # BLD AUTO: 4.06 X10*6/UL (ref 4–5.2)
RH FACTOR (ANTIGEN D): NORMAL
TREPONEMA PALLIDUM IGG+IGM AB [PRESENCE] IN SERUM OR PLASMA BY IMMUNOASSAY: NONREACTIVE
WBC # BLD AUTO: 11 X10*3/UL (ref 4.4–11.3)

## 2024-04-27 PROCEDURE — 85027 COMPLETE CBC AUTOMATED: CPT | Performed by: STUDENT IN AN ORGANIZED HEALTH CARE EDUCATION/TRAINING PROGRAM

## 2024-04-27 PROCEDURE — 59050 FETAL MONITOR W/REPORT: CPT

## 2024-04-27 PROCEDURE — 86780 TREPONEMA PALLIDUM: CPT | Performed by: STUDENT IN AN ORGANIZED HEALTH CARE EDUCATION/TRAINING PROGRAM

## 2024-04-27 PROCEDURE — 36415 COLL VENOUS BLD VENIPUNCTURE: CPT | Performed by: STUDENT IN AN ORGANIZED HEALTH CARE EDUCATION/TRAINING PROGRAM

## 2024-04-27 PROCEDURE — 86901 BLOOD TYPING SEROLOGIC RH(D): CPT | Performed by: STUDENT IN AN ORGANIZED HEALTH CARE EDUCATION/TRAINING PROGRAM

## 2024-04-27 PROCEDURE — 2500000004 HC RX 250 GENERAL PHARMACY W/ HCPCS (ALT 636 FOR OP/ED)

## 2024-04-27 PROCEDURE — 01967 NEURAXL LBR ANES VAG DLVR: CPT | Performed by: ANESTHESIOLOGY

## 2024-04-27 PROCEDURE — 2500000005 HC RX 250 GENERAL PHARMACY W/O HCPCS

## 2024-04-27 PROCEDURE — 2500000001 HC RX 250 WO HCPCS SELF ADMINISTERED DRUGS (ALT 637 FOR MEDICARE OP)

## 2024-04-27 PROCEDURE — 1210000001 HC SEMI-PRIVATE ROOM DAILY

## 2024-04-27 PROCEDURE — 7100000016 HC LABOR RECOVERY PER HOUR

## 2024-04-27 PROCEDURE — 2500000004 HC RX 250 GENERAL PHARMACY W/ HCPCS (ALT 636 FOR OP/ED): Performed by: STUDENT IN AN ORGANIZED HEALTH CARE EDUCATION/TRAINING PROGRAM

## 2024-04-27 PROCEDURE — 51701 INSERT BLADDER CATHETER: CPT

## 2024-04-27 PROCEDURE — 10907ZC DRAINAGE OF AMNIOTIC FLUID, THERAPEUTIC FROM PRODUCTS OF CONCEPTION, VIA NATURAL OR ARTIFICIAL OPENING: ICD-10-PCS | Performed by: OBSTETRICS & GYNECOLOGY

## 2024-04-27 PROCEDURE — 7210000002 HC LABOR PER HOUR

## 2024-04-27 PROCEDURE — 59409 OBSTETRICAL CARE: CPT | Performed by: OBSTETRICS & GYNECOLOGY

## 2024-04-27 PROCEDURE — 3700000014 EPIDURAL BLOCK: Mod: GC

## 2024-04-27 RX ORDER — TERBUTALINE SULFATE 1 MG/ML
0.25 INJECTION SUBCUTANEOUS ONCE AS NEEDED
Status: DISCONTINUED | OUTPATIENT
Start: 2024-04-27 | End: 2024-04-27 | Stop reason: HOSPADM

## 2024-04-27 RX ORDER — OXYTOCIN 10 [USP'U]/ML
10 INJECTION, SOLUTION INTRAMUSCULAR; INTRAVENOUS ONCE AS NEEDED
Status: DISCONTINUED | OUTPATIENT
Start: 2024-04-27 | End: 2024-04-27 | Stop reason: HOSPADM

## 2024-04-27 RX ORDER — LOPERAMIDE HYDROCHLORIDE 2 MG/1
4 CAPSULE ORAL EVERY 2 HOUR PRN
Status: DISCONTINUED | OUTPATIENT
Start: 2024-04-27 | End: 2024-04-29 | Stop reason: HOSPADM

## 2024-04-27 RX ORDER — FENTANYL/BUPIVACAINE/NS/PF 2MCG/ML-.1
PLASTIC BAG, INJECTION (ML) INJECTION CONTINUOUS PRN
Status: DISCONTINUED | OUTPATIENT
Start: 2024-04-27 | End: 2024-04-27

## 2024-04-27 RX ORDER — CARBOPROST TROMETHAMINE 250 UG/ML
250 INJECTION, SOLUTION INTRAMUSCULAR ONCE AS NEEDED
Status: DISCONTINUED | OUTPATIENT
Start: 2024-04-27 | End: 2024-04-29 | Stop reason: HOSPADM

## 2024-04-27 RX ORDER — SIMETHICONE 80 MG
80 TABLET,CHEWABLE ORAL 4 TIMES DAILY PRN
Status: DISCONTINUED | OUTPATIENT
Start: 2024-04-27 | End: 2024-04-29 | Stop reason: HOSPADM

## 2024-04-27 RX ORDER — MISOPROSTOL 200 UG/1
800 TABLET ORAL ONCE AS NEEDED
Status: DISCONTINUED | OUTPATIENT
Start: 2024-04-27 | End: 2024-04-27 | Stop reason: HOSPADM

## 2024-04-27 RX ORDER — NORETHINDRONE AND ETHINYL ESTRADIOL 0.5-0.035
KIT ORAL AS NEEDED
Status: DISCONTINUED | OUTPATIENT
Start: 2024-04-27 | End: 2024-04-27

## 2024-04-27 RX ORDER — SODIUM CHLORIDE, SODIUM LACTATE, POTASSIUM CHLORIDE, CALCIUM CHLORIDE 600; 310; 30; 20 MG/100ML; MG/100ML; MG/100ML; MG/100ML
125 INJECTION, SOLUTION INTRAVENOUS CONTINUOUS
Status: DISCONTINUED | OUTPATIENT
Start: 2024-04-27 | End: 2024-04-29

## 2024-04-27 RX ORDER — MAGNESIUM 250 MG
250 TABLET ORAL
COMMUNITY
Start: 2016-11-07

## 2024-04-27 RX ORDER — CARBOPROST TROMETHAMINE 250 UG/ML
250 INJECTION, SOLUTION INTRAMUSCULAR ONCE AS NEEDED
Status: DISCONTINUED | OUTPATIENT
Start: 2024-04-27 | End: 2024-04-27 | Stop reason: HOSPADM

## 2024-04-27 RX ORDER — POLYETHYLENE GLYCOL 3350 17 G/17G
17 POWDER, FOR SOLUTION ORAL 2 TIMES DAILY PRN
Status: DISCONTINUED | OUTPATIENT
Start: 2024-04-27 | End: 2024-04-29 | Stop reason: HOSPADM

## 2024-04-27 RX ORDER — LABETALOL HYDROCHLORIDE 5 MG/ML
20 INJECTION, SOLUTION INTRAVENOUS ONCE AS NEEDED
Status: DISCONTINUED | OUTPATIENT
Start: 2024-04-27 | End: 2024-04-27 | Stop reason: HOSPADM

## 2024-04-27 RX ORDER — METHYLERGONOVINE MALEATE 0.2 MG/ML
0.2 INJECTION INTRAVENOUS ONCE AS NEEDED
Status: DISCONTINUED | OUTPATIENT
Start: 2024-04-27 | End: 2024-04-29 | Stop reason: HOSPADM

## 2024-04-27 RX ORDER — DIPHENHYDRAMINE HCL 25 MG
25 CAPSULE ORAL EVERY 6 HOURS PRN
Status: DISCONTINUED | OUTPATIENT
Start: 2024-04-27 | End: 2024-04-29 | Stop reason: HOSPADM

## 2024-04-27 RX ORDER — OXYTOCIN/0.9 % SODIUM CHLORIDE 30/500 ML
60 PLASTIC BAG, INJECTION (ML) INTRAVENOUS ONCE AS NEEDED
Status: COMPLETED | OUTPATIENT
Start: 2024-04-27 | End: 2024-04-27

## 2024-04-27 RX ORDER — HYDRALAZINE HYDROCHLORIDE 20 MG/ML
5 INJECTION INTRAMUSCULAR; INTRAVENOUS ONCE AS NEEDED
Status: DISCONTINUED | OUTPATIENT
Start: 2024-04-27 | End: 2024-04-27 | Stop reason: HOSPADM

## 2024-04-27 RX ORDER — NIFEDIPINE 10 MG/1
10 CAPSULE ORAL ONCE AS NEEDED
Status: DISCONTINUED | OUTPATIENT
Start: 2024-04-27 | End: 2024-04-27 | Stop reason: HOSPADM

## 2024-04-27 RX ORDER — FENTANYL/BUPIVACAINE/NS/PF 2MCG/ML-.1
PLASTIC BAG, INJECTION (ML) INJECTION AS NEEDED
Status: DISCONTINUED | OUTPATIENT
Start: 2024-04-27 | End: 2024-04-27

## 2024-04-27 RX ORDER — BISACODYL 10 MG/1
10 SUPPOSITORY RECTAL DAILY PRN
Status: DISCONTINUED | OUTPATIENT
Start: 2024-04-27 | End: 2024-04-29 | Stop reason: HOSPADM

## 2024-04-27 RX ORDER — PHENYLEPHRINE HCL IN 0.9% NACL 1 MG/10 ML
SYRINGE (ML) INTRAVENOUS AS NEEDED
Status: DISCONTINUED | OUTPATIENT
Start: 2024-04-27 | End: 2024-04-27

## 2024-04-27 RX ORDER — FLUOXETINE HYDROCHLORIDE 20 MG/1
20 CAPSULE ORAL DAILY
Status: DISCONTINUED | OUTPATIENT
Start: 2024-04-27 | End: 2024-04-29 | Stop reason: HOSPADM

## 2024-04-27 RX ORDER — MISOPROSTOL 200 UG/1
800 TABLET ORAL ONCE AS NEEDED
Status: DISCONTINUED | OUTPATIENT
Start: 2024-04-27 | End: 2024-04-29 | Stop reason: HOSPADM

## 2024-04-27 RX ORDER — OXYTOCIN 10 [USP'U]/ML
10 INJECTION, SOLUTION INTRAMUSCULAR; INTRAVENOUS ONCE AS NEEDED
Status: DISCONTINUED | OUTPATIENT
Start: 2024-04-27 | End: 2024-04-29 | Stop reason: HOSPADM

## 2024-04-27 RX ORDER — ONDANSETRON HYDROCHLORIDE 2 MG/ML
4 INJECTION, SOLUTION INTRAVENOUS EVERY 6 HOURS PRN
Status: DISCONTINUED | OUTPATIENT
Start: 2024-04-27 | End: 2024-04-27

## 2024-04-27 RX ORDER — IBUPROFEN 600 MG/1
600 TABLET ORAL EVERY 6 HOURS
Status: DISCONTINUED | OUTPATIENT
Start: 2024-04-27 | End: 2024-04-29 | Stop reason: HOSPADM

## 2024-04-27 RX ORDER — OXYTOCIN/0.9 % SODIUM CHLORIDE 30/500 ML
60 PLASTIC BAG, INJECTION (ML) INTRAVENOUS ONCE AS NEEDED
Status: DISCONTINUED | OUTPATIENT
Start: 2024-04-27 | End: 2024-04-29 | Stop reason: HOSPADM

## 2024-04-27 RX ORDER — NIFEDIPINE 10 MG/1
10 CAPSULE ORAL ONCE AS NEEDED
Status: DISCONTINUED | OUTPATIENT
Start: 2024-04-27 | End: 2024-04-29 | Stop reason: HOSPADM

## 2024-04-27 RX ORDER — ONDANSETRON 4 MG/1
4 TABLET, FILM COATED ORAL EVERY 6 HOURS PRN
Status: DISCONTINUED | OUTPATIENT
Start: 2024-04-27 | End: 2024-04-27

## 2024-04-27 RX ORDER — METOCLOPRAMIDE HYDROCHLORIDE 5 MG/ML
10 INJECTION INTRAMUSCULAR; INTRAVENOUS EVERY 6 HOURS PRN
Status: DISCONTINUED | OUTPATIENT
Start: 2024-04-27 | End: 2024-04-27

## 2024-04-27 RX ORDER — ADHESIVE BANDAGE
10 BANDAGE TOPICAL
Status: DISCONTINUED | OUTPATIENT
Start: 2024-04-27 | End: 2024-04-29 | Stop reason: HOSPADM

## 2024-04-27 RX ORDER — TRANEXAMIC ACID 100 MG/ML
1000 INJECTION, SOLUTION INTRAVENOUS ONCE AS NEEDED
Status: DISCONTINUED | OUTPATIENT
Start: 2024-04-27 | End: 2024-04-29 | Stop reason: HOSPADM

## 2024-04-27 RX ORDER — METOCLOPRAMIDE 10 MG/1
10 TABLET ORAL EVERY 6 HOURS PRN
Status: DISCONTINUED | OUTPATIENT
Start: 2024-04-27 | End: 2024-04-27

## 2024-04-27 RX ORDER — HYDRALAZINE HYDROCHLORIDE 20 MG/ML
5 INJECTION INTRAMUSCULAR; INTRAVENOUS ONCE AS NEEDED
Status: DISCONTINUED | OUTPATIENT
Start: 2024-04-27 | End: 2024-04-29 | Stop reason: HOSPADM

## 2024-04-27 RX ORDER — ACETAMINOPHEN 325 MG/1
975 TABLET ORAL EVERY 6 HOURS
Status: DISCONTINUED | OUTPATIENT
Start: 2024-04-27 | End: 2024-04-29 | Stop reason: HOSPADM

## 2024-04-27 RX ORDER — LABETALOL HYDROCHLORIDE 5 MG/ML
20 INJECTION, SOLUTION INTRAVENOUS ONCE AS NEEDED
Status: DISCONTINUED | OUTPATIENT
Start: 2024-04-27 | End: 2024-04-29 | Stop reason: HOSPADM

## 2024-04-27 RX ORDER — LIDOCAINE 560 MG/1
1 PATCH PERCUTANEOUS; TOPICAL; TRANSDERMAL
Status: DISCONTINUED | OUTPATIENT
Start: 2024-04-27 | End: 2024-04-29 | Stop reason: HOSPADM

## 2024-04-27 RX ORDER — ONDANSETRON HYDROCHLORIDE 2 MG/ML
4 INJECTION, SOLUTION INTRAVENOUS EVERY 6 HOURS PRN
Status: DISCONTINUED | OUTPATIENT
Start: 2024-04-27 | End: 2024-04-29 | Stop reason: HOSPADM

## 2024-04-27 RX ORDER — ONDANSETRON 4 MG/1
4 TABLET, FILM COATED ORAL EVERY 6 HOURS PRN
Status: DISCONTINUED | OUTPATIENT
Start: 2024-04-27 | End: 2024-04-29 | Stop reason: HOSPADM

## 2024-04-27 RX ORDER — TRANEXAMIC ACID 100 MG/ML
1000 INJECTION, SOLUTION INTRAVENOUS ONCE AS NEEDED
Status: DISCONTINUED | OUTPATIENT
Start: 2024-04-27 | End: 2024-04-27 | Stop reason: HOSPADM

## 2024-04-27 RX ORDER — LOPERAMIDE HYDROCHLORIDE 2 MG/1
4 CAPSULE ORAL EVERY 2 HOUR PRN
Status: DISCONTINUED | OUTPATIENT
Start: 2024-04-27 | End: 2024-04-27 | Stop reason: HOSPADM

## 2024-04-27 RX ORDER — METHYLERGONOVINE MALEATE 0.2 MG/ML
0.2 INJECTION INTRAVENOUS ONCE AS NEEDED
Status: DISCONTINUED | OUTPATIENT
Start: 2024-04-27 | End: 2024-04-27 | Stop reason: HOSPADM

## 2024-04-27 RX ORDER — ENOXAPARIN SODIUM 100 MG/ML
60 INJECTION SUBCUTANEOUS EVERY 24 HOURS
Status: DISCONTINUED | OUTPATIENT
Start: 2024-04-28 | End: 2024-04-29 | Stop reason: HOSPADM

## 2024-04-27 RX ORDER — LIDOCAINE HYDROCHLORIDE 10 MG/ML
30 INJECTION INFILTRATION; PERINEURAL ONCE AS NEEDED
Status: DISCONTINUED | OUTPATIENT
Start: 2024-04-27 | End: 2024-04-27 | Stop reason: HOSPADM

## 2024-04-27 RX ORDER — DIPHENHYDRAMINE HYDROCHLORIDE 50 MG/ML
25 INJECTION INTRAMUSCULAR; INTRAVENOUS EVERY 6 HOURS PRN
Status: DISCONTINUED | OUTPATIENT
Start: 2024-04-27 | End: 2024-04-29 | Stop reason: HOSPADM

## 2024-04-27 RX ADMIN — Medication 60 MILLI-UNITS/MIN: at 15:49

## 2024-04-27 RX ADMIN — EPHEDRINE SULFATE 25 MG: 50 INJECTION, SOLUTION INTRAVENOUS at 14:59

## 2024-04-27 RX ADMIN — ACETAMINOPHEN 975 MG: 325 TABLET ORAL at 17:04

## 2024-04-27 RX ADMIN — SODIUM CHLORIDE, POTASSIUM CHLORIDE, SODIUM LACTATE AND CALCIUM CHLORIDE 500 ML: 600; 310; 30; 20 INJECTION, SOLUTION INTRAVENOUS at 12:33

## 2024-04-27 RX ADMIN — SODIUM CHLORIDE, POTASSIUM CHLORIDE, SODIUM LACTATE AND CALCIUM CHLORIDE 125 ML/HR: 600; 310; 30; 20 INJECTION, SOLUTION INTRAVENOUS at 15:22

## 2024-04-27 RX ADMIN — FENTANYL CITRATE 5 ML: 50 INJECTION INTRAMUSCULAR; INTRAVENOUS at 13:19

## 2024-04-27 RX ADMIN — IBUPROFEN 600 MG: 600 TABLET, FILM COATED ORAL at 17:04

## 2024-04-27 RX ADMIN — FENTANYL CITRATE 5 ML: 50 INJECTION INTRAMUSCULAR; INTRAVENOUS at 13:24

## 2024-04-27 RX ADMIN — Medication 80 MCG: at 15:02

## 2024-04-27 RX ADMIN — SODIUM CHLORIDE, POTASSIUM CHLORIDE, SODIUM LACTATE AND CALCIUM CHLORIDE 500 ML: 600; 310; 30; 20 INJECTION, SOLUTION INTRAVENOUS at 14:51

## 2024-04-27 RX ADMIN — FENTANYL CITRATE 14 ML/HR: 50 INJECTION INTRAMUSCULAR; INTRAVENOUS at 13:24

## 2024-04-27 RX ADMIN — ACETAMINOPHEN 975 MG: 325 TABLET ORAL at 23:21

## 2024-04-27 RX ADMIN — NORETHINDRONE AND ETHINYL ESTRADIOL 5 MG: KIT ORAL at 14:59

## 2024-04-27 RX ADMIN — IBUPROFEN 600 MG: 600 TABLET, FILM COATED ORAL at 23:21

## 2024-04-27 RX ADMIN — NORETHINDRONE AND ETHINYL ESTRADIOL 5 MG: KIT ORAL at 15:02

## 2024-04-27 RX ADMIN — DIPHENHYDRAMINE HYDROCHLORIDE AND LIDOCAINE HYDROCHLORIDE AND ALUMINUM HYDROXIDE AND MAGNESIUM HYDRO 10 ML: KIT at 15:05

## 2024-04-27 RX ADMIN — SODIUM CHLORIDE, POTASSIUM CHLORIDE, SODIUM LACTATE AND CALCIUM CHLORIDE 125 ML/HR: 600; 310; 30; 20 INJECTION, SOLUTION INTRAVENOUS at 12:32

## 2024-04-27 RX ADMIN — Medication 120 MCG: at 13:30

## 2024-04-27 SDOH — HEALTH STABILITY: MENTAL HEALTH: HAVE YOU USED ANY SUBSTANCES (CANABIS, COCAINE, HEROIN, HALLUCINOGENS, INHALANTS, ETC.) IN THE PAST 12 MONTHS?: NO

## 2024-04-27 SDOH — HEALTH STABILITY: MENTAL HEALTH: WISH TO BE DEAD (PAST 1 MONTH): NO

## 2024-04-27 SDOH — SOCIAL STABILITY: SOCIAL INSECURITY: ARE THERE ANY APPARENT SIGNS OF INJURIES/BEHAVIORS THAT COULD BE RELATED TO ABUSE/NEGLECT?: NO

## 2024-04-27 SDOH — SOCIAL STABILITY: SOCIAL INSECURITY: ABUSE SCREEN: ADULT

## 2024-04-27 SDOH — SOCIAL STABILITY: SOCIAL INSECURITY: DO YOU FEEL ANYONE HAS EXPLOITED OR TAKEN ADVANTAGE OF YOU FINANCIALLY OR OF YOUR PERSONAL PROPERTY?: NO

## 2024-04-27 SDOH — SOCIAL STABILITY: SOCIAL INSECURITY: HAS ANYONE EVER THREATENED TO HURT YOUR FAMILY OR YOUR PETS?: NO

## 2024-04-27 SDOH — HEALTH STABILITY: MENTAL HEALTH: STRENGTHS (MUST CHOOSE TWO): SUPPORT FROM FAMILY;SUPPORT FROM FRIENDS

## 2024-04-27 SDOH — SOCIAL STABILITY: SOCIAL INSECURITY: PHYSICAL ABUSE: DENIES

## 2024-04-27 SDOH — ECONOMIC STABILITY: HOUSING INSECURITY: DO YOU FEEL UNSAFE GOING BACK TO THE PLACE WHERE YOU ARE LIVING?: NO

## 2024-04-27 SDOH — HEALTH STABILITY: MENTAL HEALTH: SUICIDAL BEHAVIOR (LIFETIME): NO

## 2024-04-27 SDOH — SOCIAL STABILITY: SOCIAL INSECURITY: DOES ANYONE TRY TO KEEP YOU FROM HAVING/CONTACTING OTHER FRIENDS OR DOING THINGS OUTSIDE YOUR HOME?: NO

## 2024-04-27 SDOH — SOCIAL STABILITY: SOCIAL INSECURITY: HAVE YOU HAD THOUGHTS OF HARMING ANYONE ELSE?: NO

## 2024-04-27 SDOH — HEALTH STABILITY: MENTAL HEALTH: NON-SPECIFIC ACTIVE SUICIDAL THOUGHTS (PAST 1 MONTH): NO

## 2024-04-27 SDOH — HEALTH STABILITY: MENTAL HEALTH: HAVE YOU USED ANY PRESCRIPTION DRUGS OTHER THAN PRESCRIBED IN THE PAST 12 MONTHS?: NO

## 2024-04-27 SDOH — SOCIAL STABILITY: SOCIAL INSECURITY: VERBAL ABUSE: DENIES

## 2024-04-27 SDOH — HEALTH STABILITY: MENTAL HEALTH: WERE YOU ABLE TO COMPLETE ALL THE BEHAVIORAL HEALTH SCREENINGS?: YES

## 2024-04-27 SDOH — SOCIAL STABILITY: SOCIAL INSECURITY: ARE YOU OR HAVE YOU BEEN THREATENED OR ABUSED PHYSICALLY, EMOTIONALLY, OR SEXUALLY BY ANYONE?: NO

## 2024-04-27 ASSESSMENT — PAIN SCALES - GENERAL
PAINLEVEL_OUTOF10: 6
PAINLEVEL_OUTOF10: 0 - NO PAIN
PAINLEVEL_OUTOF10: 0 - NO PAIN
PAINLEVEL_OUTOF10: 2
PAINLEVEL_OUTOF10: 0 - NO PAIN
PAINLEVEL_OUTOF10: 6
PAINLEVEL_OUTOF10: 0 - NO PAIN
PAINLEVEL_OUTOF10: 0 - NO PAIN

## 2024-04-27 ASSESSMENT — PATIENT HEALTH QUESTIONNAIRE - PHQ9
2. FEELING DOWN, DEPRESSED OR HOPELESS: NOT AT ALL
1. LITTLE INTEREST OR PLEASURE IN DOING THINGS: NOT AT ALL
SUM OF ALL RESPONSES TO PHQ9 QUESTIONS 1 & 2: 0

## 2024-04-27 ASSESSMENT — PAIN DESCRIPTION - LOCATION: LOCATION: ABDOMEN

## 2024-04-27 ASSESSMENT — LIFESTYLE VARIABLES
AUDIT-C TOTAL SCORE: 0
HOW MANY STANDARD DRINKS CONTAINING ALCOHOL DO YOU HAVE ON A TYPICAL DAY: PATIENT DOES NOT DRINK
SKIP TO QUESTIONS 9-10: 1
HOW OFTEN DO YOU HAVE A DRINK CONTAINING ALCOHOL: NEVER
AUDIT-C TOTAL SCORE: 0
HOW OFTEN DO YOU HAVE 6 OR MORE DRINKS ON ONE OCCASION: NEVER

## 2024-04-27 ASSESSMENT — ACTIVITIES OF DAILY LIVING (ADL): LACK_OF_TRANSPORTATION: NO

## 2024-04-27 ASSESSMENT — PAIN - FUNCTIONAL ASSESSMENT
PAIN_FUNCTIONAL_ASSESSMENT: 0-10
PAIN_FUNCTIONAL_ASSESSMENT: 0-10

## 2024-04-27 NOTE — SIGNIFICANT EVENT
House officer to bedside for prolonged deceleration. Patient lying down after epidural. FHT showing deceleration to guerda of 90 bpm for ~5 minutes. Patient repositioned, fluid bolus infusing. Cervical exam performed 6/80/-1, unchanged. TOCO with contractions q5 minutes. No cord palpated through bag. Resolution of FHR to 157 bpm with intervention. Moderate variability. Deceleration likely in setting of epidural and recent blood pressure fluctuation. Will continue to monitor, given reassuring tracing overall. Given spaced contractions, considering AROM soon.    Seen with Dr. Roma Matos MD, PGY-1

## 2024-04-27 NOTE — ANESTHESIA PREPROCEDURE EVALUATION
Patient: Ofe Tee    Evaluation Method: In-person visit    Procedure Information    Date: 04/27/24  Procedure: Labor Analgesia         Relevant Problems   Anesthesia (within normal limits)      Cardiac (within normal limits)      Pulmonary (within normal limits)      Neuro   (+) Generalized anxiety disorder      GI   (+) GERD without esophagitis   (+) Irritable bowel syndrome (IBS)      /Renal (within normal limits)      Endocrine   (+) Class 2 obesity without serious comorbidity with body mass index (BMI) of 39.0 to 39.9 in adult       Clinical information reviewed:   Tobacco  Allergies  Meds   Med Hx  Surg Hx   Fam Hx          NPO Detail:  No data recorded     OB/Gyn Evaluation    Present Pregnancy    Patient is pregnant now.   Obstetric History                Physical Exam    Airway  Mallampati: IV  TM distance: >3 FB  Neck ROM: full     Cardiovascular - normal exam     Dental    Pulmonary - normal exam     Abdominal - normal exam             Anesthesia Plan    History of general anesthesia?: yes  History of complications of general anesthesia?: no    ASA 3     epidural     Anesthetic plan and risks discussed with patient.    Plan discussed with attending.

## 2024-04-27 NOTE — DISCHARGE INSTRUCTIONS

## 2024-04-27 NOTE — CARE PLAN
Problem: Fall/Injury  Goal: Be free from injury by end of the shift  Outcome: Progressing   The patient's goals for the shift include      The clinical goals for the shift include healthy mom/ healthy baby

## 2024-04-27 NOTE — L&D DELIVERY NOTE
OB Delivery Note  2024  Ofe Tee  28 y.o.   Vaginal, Spontaneous         of infant and placenta. EBL from delivery 150 ml. Superficial, hemostatic, and well-approximated lacerations on the external R labia - not repaired.    Gestational Age: 38w4d  /Para:   Quantitative Blood Loss: Admission to Discharge: 179 mL (2024 11:22 AM - 2024  6:04 PM)    Tiera Tee [[id number]]      Labor Events    Sac identifier: Sac 1  Rupture date/time: 2024 1402  Rupture type: Artificial  Fluid color: Clear  Labor type: Spontaneous Onset of Labor  Complications: None       Labor Event Times    Labor onset date/time: 2024 1152  Dilation complete date/time: 2024 150  Start pushing date/time: 2024 150       Labor Length    1st stage: 3h 12m  2nd stage: 0h 14m  3rd stage: 0h 03m       Placenta    Placenta delivery date/time: 2024 1521  Placenta removal: Spontaneous  Placenta appearance: Intact  Placenta disposition: discarded       Cord    Vessels: 3 vessels  Complications: None  Delayed cord clamping?: Yes  Cord clamped date/time: 2024 1519  Cord blood disposition: Lab  Gases sent?: No       Lacerations    Episiotomy: None  Perineal laceration: None  Other lacerations?: No  Repair suture: None       Anesthesia    Method: Epidural       Operative Delivery    Forceps attempted?: No  Vacuum extractor attempted?: No       Shoulder Dystocia    Shoulder dystocia present?: No       [place] Delivery    Time head delivered: 2024 15:17:30  Birth date/time: 2024 15:18:00  Delivery type: Vaginal, Spontaneous  Complications: None       Resuscitation    Method: Tactile stimulation, Suctioning       Apgars    Living status: Living  Apgar Component Scores:  1 min.:  5 min.:  10 min.:  15 min.:  20 min.:    Skin color:  0  1       Heart rate:  2  2       Reflex irritability:  2  2       Muscle tone:  1  2       Respiratory effort:  2  2       Total:  7  9       Apgars  assigned by: MARYLU GONZALEZ RN       Delivery Providers    Delivering clinician: Maritza Brandon MD   Provider Role    Sari Mcgee, RN Delivery Nurse    Iman Gonzalez, RN Nursery Nurse    Thelma Matos MD Resident                 Thelma Matos MD PGY1

## 2024-04-27 NOTE — ANESTHESIA PROCEDURE NOTES
Epidural Block    Patient location during procedure: OB  Start time: 4/27/2024 12:40 PM  Reason for block: labor analgesia  Staffing  Performed: attending   Authorized by: Isidro Bonilla DO    Performed by: Isidro Bonilla DO    Preanesthetic Checklist  Completed: patient identified, IV checked, risks and benefits discussed, surgical consent, monitors and equipment checked, pre-op evaluation, timeout performed and sterile techniques followed  Block Timeout  RN/Licensed healthcare professional reads aloud to the Anesthesia provider and entire team: Patient identity, procedure with side and site, patient position, and as applicable the availability of implants/special equipment/special requirements.  Patient on coagulant treatment: no  Timeout performed at: 4/27/2024 12:40 PM  Block Placement  Patient position: sitting  Prep: ChloraPrep  Sterility prep: cap, drape, gloves, hand and mask  Sedation level: no sedation  Patient monitoring: blood pressure, continuous pulse oximetry and heart rate  Approach: midline  Local numbing: lidocaine 1% to skin and subcutaneous tissues  Vertebral space: lumbar  Epidural  Loss of resistance technique: saline and air  Guidance: landmark technique        Needle  Needle type: Tuohy   Needle gauge: 17  Needle length: 8.9cm  Needle insertion depth: 8.5 cm  Catheter type: multi-orifice  Catheter size: 19 G  Catheter at skin depth: 13.5 cm  Catheter securement method: clear occlusive dressing and liquid medical adhesive    Test dose: lidocaine 1.5% with epinephrine 1-to-200,000  Test dose: lidocaine 1.5% with epinephrine 1-to-200,000  Test dose result: no positive test dose    PCEA  Medication concentration used: 0.044% Bupivacaine with 1.25 mcg/mL Fentanyl and 1:[id number] Epinephrine  Dose (mL): 10  Lockout (minutes): 15  1-Hour Limit (boluses/hr): 4  Basal Rate: 14        Assessment  Sensory level: other (T8) bilateral  Block outcome: patient comfortable  Number of attempts:  1  Events: no positive test dose  Procedure assessment: patient tolerated procedure well with no immediate complications

## 2024-04-27 NOTE — H&P
Obstetrical Admission History and Physical     Ofe Tee is a 28 y.o.  at 38w4d. NAYELI: 2024, by Last Menstrual Period c/w 13.3 wk US. She has had prenatal care with Dr. Brandon .    Chief Complaint: Contractions    Assessment/Plan      Labor  - Regular, painful CTXs  - SVE /-2  - Admit to L&D for labor  - Scanned cephalic, consented per Dr. Matos  - Monitor VS per unit protocol  - Routine labs ordered  - Encourage frequent position changes  - Clear liquid diet  - Continuous fetal monitoring  - Pain management per pt request  - Continue assessment of maternal and fetal well being  - Recheck as clinically indicated by maternal and/or fetal status  - Will AROM and/or start Pitocin for labor augmentation as needed  - Anticipate SVB    Maternal Well-being  - Vital signs stable and WNL  - Emotional support and reassurance provided  - All questions and concerns addressed    IUP @ 38w4d  - prenatal lab work reviewed   - abnormal 1 hr, normal 3 hr  - Cat I tracing   - GBS negative  - Last growth on  at 37.1 wga: EFW 61% with AC 88%    Contraception  - partner planning vasectomy, undecided if desires bridging    - Dr. Brandon aware of admission and en route  - Pt report to Dr. Matos for further management of care    The patient's pregnancy complications have been discussed with the patient in detail.  Admit to inpatient status. I anticipate that this patient will require a stay exceeding 2 midnights.  Active management of the pregnancy complications.     VIDAL Bernard-CNP      Principal Problem:    Normal labor ([facility])      Pregnancy Problems (from 10/05/23 to present)       Problem Noted Resolved    Class 2 obesity without serious comorbidity with body mass index (BMI) of 39.0 to 39.9 in adult 2023 by Edilma Bowers MD No    Priority:  Medium      Generalized anxiety disorder 3/2/2023 by Lizz Benavides No    Priority:  Medium      GERD without esophagitis 3/2/2023 by Lizz  Kennedy No    Priority:  Medium      Irritable bowel syndrome (IBS) 3/2/2023 by Lizz Benavides No    Priority:  Medium      Vitamin D deficiency 3/2/2023 by Lizz Benavides No    Priority:  Medium            Subjective   Ofe is here with regular and progressively more painful contractions since 7 am. She denies loss of fluid, reports scant pink spotting after contractions started today, and reports good FM.      Obstetrical History   OB History    Para Term  AB Living   3 2 1 1   2   SAB IAB Ectopic Multiple Live Births           2      # Outcome Date GA Lbr Troy/2nd Weight Sex Delivery Anes PTL Lv   3 Current            2 Term 10/01/21   2.835 kg F Vag-Spont EPI N FELIX   1  04/15/20 36w6d  2.75 kg M Vag-Spont EPI Y FELIX      Birth Comments: PPROM, spontaneous labor.      Complications: History of  premature rupture of membranes (PPROM)       Past Medical History  Past Medical History:   Diagnosis Date    Allergy status to unspecified drugs, medicaments and biological substances     History of seasonal allergies    Bacterial vaginosis 2023    COVID-19 2020    COVID-19    Personal history of other diseases of the respiratory system 01/15/2016    History of streptococcal pharyngitis        Past Surgical History   No past surgical history on file.    Social History  Social History     Tobacco Use    Smoking status: Never    Smokeless tobacco: Never   Substance Use Topics    Alcohol use: Never     Substance and Sexual Activity   Drug Use Not on file       Allergies  Penicillins     Medications  Medications Prior to Admission   Medication Sig Dispense Refill Last Dose    magnesium 250 mg tablet Take 1 tablet (250 mg) by mouth once daily.       ferrous gluconate 256 mg (28 mg iron) tablet Take 1 tablet (28 mg) by mouth once daily.       FLUoxetine (PROzac) 20 mg capsule Take 1 capsule (20 mg) by mouth once daily. 90 capsule 1     multivitamin tablet Take 1 tablet by mouth once  daily.          Objective    Last Vitals  Temp Pulse Resp BP MAP O2 Sat   36.1 °C (97 °F) 97 16 131/61   99 %     Physical Examination  Physical Exam  Constitutional:       Appearance: Normal appearance.   HENT:      Head: Atraumatic.      Mouth/Throat:      Mouth: Mucous membranes are moist.   Cardiovascular:      Rate and Rhythm: Normal rate and regular rhythm.      Heart sounds: No murmur heard.  Pulmonary:      Effort: Pulmonary effort is normal.      Breath sounds: Normal breath sounds.   Abdominal:      Palpations: Abdomen is soft.      Tenderness: There is no abdominal tenderness.   Genitourinary:     Comments: Cervical Exam  Dilation: 6  Effacement (%): 80  Fetal Station: -2  Method: Manual  OB Examiner: Ana OHARA  Fetal Assessment  Movement: Present  Mode: External US  Baseline 130s, mod variablity, no accels, no decels   Fetal Decelerations: No  Contraction Frequency: 2-3    Musculoskeletal:         General: Normal range of motion.      Cervical back: Normal range of motion.      Comments: +1 pitting edema BLE   Skin:     General: Skin is warm and dry.      Capillary Refill: Capillary refill takes less than 2 seconds.   Neurological:      Mental Status: She is alert and oriented to person, place, and time.   Psychiatric:         Behavior: Behavior normal.       Lab Review  Admission on 04/27/2024   Component Date Value Ref Range Status    WBC 04/27/2024 11.0  4.4 - 11.3 x10*3/uL Final    nRBC 04/27/2024 0.0  0.0 - 0.0 /100 WBCs Final    RBC 04/27/2024 4.06  4.00 - 5.20 x10*6/uL Final    Hemoglobin 04/27/2024 12.6  12.0 - 16.0 g/dL Final    Hematocrit 04/27/2024 36.9  36.0 - 46.0 % Final    MCV 04/27/2024 91  80 - 100 fL Final    MCH 04/27/2024 31.0  26.0 - 34.0 pg Final    MCHC 04/27/2024 34.1  32.0 - 36.0 g/dL Final    RDW 04/27/2024 13.2  11.5 - 14.5 % Final    Platelets 04/27/2024 205  150 - 450 x10*3/uL Final

## 2024-04-28 PROBLEM — Z37.9 NORMAL LABOR (HHS-HCC): Status: RESOLVED | Noted: 2024-04-27 | Resolved: 2024-04-28

## 2024-04-28 PROCEDURE — 2500000004 HC RX 250 GENERAL PHARMACY W/ HCPCS (ALT 636 FOR OP/ED)

## 2024-04-28 PROCEDURE — 2500000001 HC RX 250 WO HCPCS SELF ADMINISTERED DRUGS (ALT 637 FOR MEDICARE OP)

## 2024-04-28 PROCEDURE — 1210000001 HC SEMI-PRIVATE ROOM DAILY

## 2024-04-28 RX ORDER — ACETAMINOPHEN 325 MG/1
975 TABLET ORAL EVERY 6 HOURS
Qty: 60 TABLET | Refills: 2 | Status: SHIPPED | OUTPATIENT
Start: 2024-04-28

## 2024-04-28 RX ORDER — IBUPROFEN 600 MG/1
600 TABLET ORAL EVERY 6 HOURS
Qty: 60 TABLET | Refills: 2 | Status: SHIPPED | OUTPATIENT
Start: 2024-04-28

## 2024-04-28 RX ORDER — POLYETHYLENE GLYCOL 3350 17 G/17G
17 POWDER, FOR SOLUTION ORAL 2 TIMES DAILY PRN
Qty: 3 PACKET | Refills: 2 | Status: SHIPPED | OUTPATIENT
Start: 2024-04-28

## 2024-04-28 RX ADMIN — ACETAMINOPHEN 975 MG: 325 TABLET ORAL at 06:02

## 2024-04-28 RX ADMIN — IBUPROFEN 600 MG: 600 TABLET, FILM COATED ORAL at 12:06

## 2024-04-28 RX ADMIN — IBUPROFEN 600 MG: 600 TABLET, FILM COATED ORAL at 06:02

## 2024-04-28 RX ADMIN — FLUOXETINE 20 MG: 20 CAPSULE ORAL at 09:00

## 2024-04-28 RX ADMIN — ACETAMINOPHEN 975 MG: 325 TABLET ORAL at 12:06

## 2024-04-28 RX ADMIN — ENOXAPARIN SODIUM 60 MG: 100 INJECTION SUBCUTANEOUS at 16:00

## 2024-04-28 RX ADMIN — ACETAMINOPHEN 975 MG: 325 TABLET ORAL at 18:48

## 2024-04-28 RX ADMIN — IBUPROFEN 600 MG: 600 TABLET, FILM COATED ORAL at 18:48

## 2024-04-28 ASSESSMENT — PAIN DESCRIPTION - DESCRIPTORS
DESCRIPTORS: CRAMPING
DESCRIPTORS: CRAMPING

## 2024-04-28 ASSESSMENT — PAIN SCALES - GENERAL
PAINLEVEL_OUTOF10: 4
PAINLEVEL_OUTOF10: 4
PAINLEVEL_OUTOF10: 0 - NO PAIN
PAINLEVEL_OUTOF10: 4

## 2024-04-28 NOTE — LACTATION NOTE
"Lactation Consultant Note  Lactation Consultation  Reason for Consult: Initial assessment  Consultant Name: Lissette Acuna RN IBCLC    Maternal Information  Has mother  before?: Yes  How long did the mother previously breastfeed?: three months with her four year old- and \"a bit\" with her second  Previous Maternal Breastfeeding Challenges: Low milk supply, Other (Comment) (low milk supply with second as mother attributes to her increased anxiety)  Infant to breast within first 2 hours of birth?: Yes    Maternal Assessment  Breast Assessment: Other (Comment) (deferred)  Nipple Assessment: Other (Comment) (deferred)    Infant Assessment  Infant Behavior: Deep sleep  Infant Assessment:  (suck not assessed with this visit)    Feeding Assessment  Nutrition Source: Breastmilk  Feeding Method: Nursing at the breast    LATCH TOOL       Breast Pump       Other OB Lactation Tools       Patient Follow-up  Inpatient Lactation Follow-up Needed : Yes    Other OB Lactation Documentation  Maternal Risk Factors: Obesity (pre-pregnancy BMI >30), Previous low supply (low supply due to probable increased anxiety)  Infant Risk Factors: Early term birth 37-39 weeks    Recommendations/Summary  A feeding was not observed with this visit. Mother had fed infant recently. She shared that her bedside RN had assisted with this feeding as she was experiencing some discomfort with his latch. Mother reported that they had detached infant and re-latched him with a deeper and more comfortable latch. Explained to mother the importance of latching infant both deeply and properly for her comfort and effective milk transfer. Discussed how to visualize her nipple appearing round and elongated at end of feeding to confirm a good latch. Reviewed breast support with feeding and ways to stimulate infant if he is sleepy and sluggish when at breast. Encouraged mother to call when next feeding infant for an observation. Mother has a breast pump for " home.        4/28/24 @ 1200- Mother called for a feeding observation. Infant was latched onto mothers left breast. Noted that infant was not well supported nor in good alignment with feed. Latch was a bit shallow. Assisted to align infant and provide adequate support by placing a pillow up underneath infant and having mothers forearm keep infant well supported. Minimally assisted with latching infant deeper onto mothers breast. Noted that mother was pushing back on her breast tissue near infants nose. Discouraged this behavior with rationale provided. Infant held a sustained and comfortable latch with a few swallows appreciated. A cool cloth was used to keep infant interested in continuing to nurse. Reviewed with mother various ways to stimulate a sleepy and sluggish infant at breast. Mother shared that infant fed more assertively on her other breast as this observation occurred on his second side.

## 2024-04-28 NOTE — ANESTHESIA POSTPROCEDURE EVALUATION
Patient: Ofe Tee    Procedure Summary       Date: 24 Room / Location:     Anesthesia Start: 1240 Anesthesia Stop: 1518    Procedure: Labor Analgesia Diagnosis:     Scheduled Providers:  Responsible Provider: Hector Beal DO    Anesthesia Type: epidural ASA Status: 3            Anesthesia Type: epidural        Anesthesia Post Evaluation    Patient location during evaluation: floor  Patient participation: complete - patient participated  Level of consciousness: awake and alert  Pain management: adequate  Airway patency: patent  Cardiovascular status: acceptable  Respiratory status: acceptable  Hydration status: acceptable  Postoperative Nausea and Vomiting: none  Comments: Ofe Tee is a 28 y.o., , who had a Vaginal, Spontaneous delivery on 2024 at 38w4d and is now POD1.    She had Neuraxial Anesthesia without immediate complications noted.       Pain well controlled    -------------------------              24                    0430        -------------------------   BP:         103/69        Pulse:        79          Resp:         16          Temp:   36.1 °C (97 °F)   SpO2:         95%        -------------------------    Neuraxial site assessed. No visible redness or swelling or drainage. Patient able to ambulate and move all extremities without difficulty. Able to void. No complaints of nausea/vomiting. Tolerating PO intake well. No s/sx of PDPH.     Anesthesia will sign off     Krystle Meehan DO        No notable events documented.

## 2024-04-29 ENCOUNTER — PHARMACY VISIT (OUTPATIENT)
Dept: PHARMACY | Facility: CLINIC | Age: 29
End: 2024-04-29
Payer: MEDICARE

## 2024-04-29 VITALS
DIASTOLIC BLOOD PRESSURE: 78 MMHG | SYSTOLIC BLOOD PRESSURE: 115 MMHG | HEIGHT: 67 IN | TEMPERATURE: 98.1 F | BODY MASS INDEX: 42.87 KG/M2 | HEART RATE: 69 BPM | WEIGHT: 273.15 LBS | OXYGEN SATURATION: 95 % | RESPIRATION RATE: 16 BRPM

## 2024-04-29 PROCEDURE — RXMED WILLOW AMBULATORY MEDICATION CHARGE

## 2024-04-29 PROCEDURE — 2500000001 HC RX 250 WO HCPCS SELF ADMINISTERED DRUGS (ALT 637 FOR MEDICARE OP)

## 2024-04-29 RX ADMIN — ACETAMINOPHEN 975 MG: 325 TABLET ORAL at 12:30

## 2024-04-29 RX ADMIN — ACETAMINOPHEN 975 MG: 325 TABLET ORAL at 05:51

## 2024-04-29 RX ADMIN — IBUPROFEN 600 MG: 600 TABLET, FILM COATED ORAL at 05:51

## 2024-04-29 RX ADMIN — FLUOXETINE 20 MG: 20 CAPSULE ORAL at 10:24

## 2024-04-29 RX ADMIN — ACETAMINOPHEN 975 MG: 325 TABLET ORAL at 00:21

## 2024-04-29 RX ADMIN — IBUPROFEN 600 MG: 600 TABLET, FILM COATED ORAL at 00:21

## 2024-04-29 RX ADMIN — IBUPROFEN 600 MG: 600 TABLET, FILM COATED ORAL at 12:30

## 2024-04-29 ASSESSMENT — PAIN DESCRIPTION - LOCATION
LOCATION: ABDOMEN
LOCATION: ABDOMEN

## 2024-04-29 ASSESSMENT — PAIN SCALES - GENERAL
PAINLEVEL_OUTOF10: 3
PAINLEVEL_OUTOF10: 0 - NO PAIN
PAINLEVEL_OUTOF10: 4

## 2024-04-29 NOTE — PROGRESS NOTES
Postpartum Progress Note    Assessment/Plan   Ofe Tee is a 28 y.o., , who delivered at 38w4d gestation and is now postpartum day 1 after spontaneous labor, AROM.  Delivered a boy (Isaias) Apgars 7, 9.  Weight 6 pounds 15 ounces.  No repair.  She is breast-feeding well.  She voices no concerns.  She is meeting milestones.  There is no dysuria, minimal lochia.    Plan is home tomorrow with prescriptions for ibuprofen, Tylenol and MiraLAX.  Follow-up in 6 weeks for postpartum visit.    Principal Problem:    Normal labor ([facility])    Pregnancy Problems (from 10/05/23 to present)       Problem Noted Resolved    Class 2 obesity without serious comorbidity with body mass index (BMI) of 39.0 to 39.9 in adult 2023 by Edilma Bowers MD No    Priority:  Medium      Generalized anxiety disorder 3/2/2023 by Lizz Benavides No    Priority:  Medium      GERD without esophagitis 3/2/2023 by Lizz Benavides No    Priority:  Medium      Irritable bowel syndrome (IBS) 3/2/2023 by Lizz Benavides No    Priority:  Medium      Vitamin D deficiency 3/2/2023 by Lizz Benavides No    Priority:  Medium            Hospital course: no complications  Vaginal Birth  Patient is currently breastfeedingThe patient's blood type is O POS. The baby's blood type is B POS . Rhogam is not indicated.    Subjective   Her pain is well controlled with current medications  She is passing flatus  She is ambulating well  She is tolerating a Adult diet Regular  She reports no breast or nursing problems  She denies emotional concerns today   Her plan for contraception is discuss at [facility].         Objective   Allergies:   Penicillins         Last Vitals:  Temp Pulse Resp BP MAP Pulse Ox   36.3 °C (97.3 °F) 70 16 108/74   96 %     Vitals Min/Max Last 24 Hours:  Temp  Min: 36.1 °C (97 °F)  Max: 36.4 °C (97.5 °F)  Pulse  Min: 70  Max: 79  Resp  Min: 16  Max: 16  BP  Min: 103/69  Max: 111/75    Intake/Output:   No intake or output data in the 24 hours ending  04/28/24 2120    Physical Exam:  General: Examination reveals a well developed, well nourished, female, in no acute distress. She is alert and cooperative.  Constitutional: Alert and in no acute distress. Well developed, well nourished.   Head and Face: Head and face: Normal.    Eyes: Normal external exam - nonicteric sclera  Pulmonary: No respiratory distress.   Abdomen: Soft ,nontender, fundus firm, nontender at 2 cm below umbilicus.    Neurologic: Non-focal. Grossly intact.   Psychiatric: Alert and oriented x 3. Affect normal to patient baseline. Mood: Appropriate.  Lab Data:  Lab Results   Component Value Date    ABO O 04/27/2024    LABRH POS 04/27/2024    ABSCRN NEG 04/27/2024

## 2024-04-29 NOTE — DISCHARGE SUMMARY
Discharge Summary    Admission Date: 4/27/2024  Discharge Date: 4/29/24    Discharge Diagnosis  Normal labor ([facility])    Hospital Course  Delivery Date: 4/27/2024  3:18 PM   Delivery type: Vaginal, Spontaneous  , no repair  GA at delivery: 38w4d, spontaneous labor  Outcome: Living , boy (Isaias), 6#15oz  Anesthesia during delivery: Epidural   Intrapartum complications: None   Feeding method: Breastfeeding Status: Yes     Procedures: none  Contraception at discharge: none  N/a    Pertinent Physical Exam At Time of Discharge  GENERAL: Examination reveals a well developed, well nourished, gravid female in no acute distress. She is alert and cooperative.  General: Examination reveals a well developed, well nourished, female, in no acute distress. She is alert and cooperative.    Discharge Meds     Your medication list        ASK your doctor about these medications        Instructions Last Dose Given Next Dose Due   ferrous gluconate 256 mg (28 mg iron) tablet           FLUoxetine 20 mg capsule  Commonly known as: PROzac      Take 1 capsule (20 mg) by mouth once daily.       magnesium 250 mg tablet           multivitamin tablet                     Complications Requiring Follow-Up  None. Plan 6 weeks postpartum check.    Test Results Pending At Discharge  Pending Labs       No current pending labs.            Outpatient Follow-Up  Future Appointments   Date Time Provider Department Center   5/1/2024  3:00 PM Maritza Brandon MD [id number] [place]   5/2/2024  2:00 PM [facility] L&D PROCEDURE ROOM 1 [address] spent 40 minutes in the professional and overall care of this patient.      Maritza Brandon MD

## 2024-04-29 NOTE — LACTATION NOTE
Lactation Consultant Note  Lactation Consultation  Reason for Consult: Follow-up assessment, Other (Comment) (supplement with formula d/t low blood sugars)  Consultant Name: Chantelle Oakley RN, IBCLC    Maternal Information       Maternal Assessment  Breast Assessment: Medium, Symmetrical, Compressible, Other (Comment) (expressible)  Nipple Assessment: Intact, Red/inflamed, Distorted shape or flattened, Other (Comment) (assisted with a deeper latch)  Alterations in Nipple Condition: Stage I - pain or irritation with no skin break down  Areola Assessment: Normal    Infant Assessment  Infant Behavior: Suckles on and off, needs stimulation, Feeding cues observed, Readiness to feed  Infant Assessment: Palate - high/arch/bubble/normal, Good cupping of tongue, Able to elevate tongue to roof of mouth, Tongue protrudes over alveolar ridge    Feeding Assessment  Nutrition Source: Breastmilk, Formula (per mother’s request)  Feeding Method: Nursing at the breast  Feeding Position: Cross - cradle, C - hold  Suck/Feeding: Sustained, Coordinated suck/swallow/breathe, Tactile stimulation needed, Audible swallowing  Latch Assessment: Minimal assistance is needed, Instructed on deep latch, Areolar attachment, Eagerly grasped on to latch, Shallow latch, Deep latch obtained, Optimal angle of mouth opening, Comfortable with no pain, Latch is painful, Flanged lips, Other (Comment) (No pain after assistance with latching)    LATCH TOOL  Latch: Grasps breast, tongue down, lips flanged, rhythmic sucking  Audible Swallowing: A few with stimulation  Type of Nipple: Everted (After stimulation)  Comfort (Breast/Nipple): Soft/non-tender  Hold (Positioning): Minimal assist, teach one side, mother does other, staff holds  LATCH Score: 8    Breast Pump       Other OB Lactation Tools       Patient Follow-up  Outpatient Lactation Follow-up: Recommended    Other OB Lactation Documentation  Maternal Risk Factors: Obesity (pre-pregnancy BMI  >30)  Infant Risk Factors: Early term birth 37-39 weeks    Recommendations/Summary  Mom attempting to latch the baby at this time.   Offered to assist the baby to latch at the breast and she was agreeable.   Reviewed positioning of baby (in cross cradle hold on both breast),  use of pillow support, hand placement on baby and on breast, expression of colostrum to the nipple prior to latching, latching technique, and use of breast compression and stimulation to keep the baby feeding at the breast longer.    Deep latch obtained on both breasts after the baby attempted a few times. Baby came on and off for a few minutes to start but, eventually settled into a good rhythmic pattern.   Mom needed to move her points finger back away from the breast tissue so, he baby chin reaches the breast tissue.     The baby was supplemented with formula through the night d/t low blood sugars last night.   Mom stated she will breast feed and supplement with formula until her full milk supply comes in.     Encouraged mom to breastfeed on demand with a goal of 8-12 feeds in a 24 hour period.   If baby is not showing feeding cues and it has been 3 hours since the last time the baby was fed or the last time the baby attempted to feed, encouraged her to place baby in skin to skin.    If she chooses to supplement with formula; encouraged her to pump for 20 minutes on both breasts and to give the baby any pumped breast milk prior to any use of supplement.      Encouraged her to call for assistance, if needed.     Encouraged her to utilize the outpatient lactation resources, if needed.   Contact information given.   [phone] [facility] or [phone] [place]

## 2024-05-01 ENCOUNTER — APPOINTMENT (OUTPATIENT)
Dept: OBSTETRICS AND GYNECOLOGY | Facility: CLINIC | Age: 29
End: 2024-05-01
Payer: COMMERCIAL

## 2024-06-12 ENCOUNTER — APPOINTMENT (OUTPATIENT)
Dept: OBSTETRICS AND GYNECOLOGY | Facility: CLINIC | Age: 29
End: 2024-06-12
Payer: MEDICAID

## 2024-06-12 ENCOUNTER — POSTPARTUM VISIT (OUTPATIENT)
Dept: OBSTETRICS AND GYNECOLOGY | Facility: CLINIC | Age: 29
End: 2024-06-12
Payer: MEDICAID

## 2024-06-12 VITALS — BODY MASS INDEX: 42.6 KG/M2 | DIASTOLIC BLOOD PRESSURE: 72 MMHG | SYSTOLIC BLOOD PRESSURE: 116 MMHG | WEIGHT: 272 LBS

## 2024-06-12 PROCEDURE — 0503F POSTPARTUM CARE VISIT: CPT | Performed by: OBSTETRICS & GYNECOLOGY

## 2024-06-12 ASSESSMENT — EDINBURGH POSTNATAL DEPRESSION SCALE (EPDS)
THE THOUGHT OF HARMING MYSELF HAS OCCURRED TO ME: NEVER
THINGS HAVE BEEN GETTING ON TOP OF ME: NO, I HAVE BEEN COPING AS WELL AS EVER
I HAVE FELT SAD OR MISERABLE: NO, NOT AT ALL
I HAVE BLAMED MYSELF UNNECESSARILY WHEN THINGS WENT WRONG: NO, NEVER
I HAVE BEEN SO UNHAPPY THAT I HAVE HAD DIFFICULTY SLEEPING: NOT AT ALL
I HAVE BEEN ANXIOUS OR WORRIED FOR NO GOOD REASON: NO, NOT AT ALL
I HAVE LOOKED FORWARD WITH ENJOYMENT TO THINGS: AS MUCH AS I EVER DID
I HAVE BEEN SO UNHAPPY THAT I HAVE BEEN CRYING: NO, NEVER
I HAVE BEEN ABLE TO LAUGH AND SEE THE FUNNY SIDE OF THINGS: AS MUCH AS I ALWAYS COULD
I HAVE FELT SCARED OR PANICKY FOR NO GOOD REASON: NO, NOT AT ALL
TOTAL SCORE: 0

## 2024-06-12 NOTE — PROGRESS NOTES
"Postpartum Progress Note    Assessment/Plan   Ofe Tee is a 29 y.o., , who delivered at 38w4d gestation and is now postpartum day 46.  She had a vaginal delivery of a boy on 2024 weighing 6 pounds 15 ounces.  \"Isaias\".  No repair.  No complications.  She is breast-feeding.  No bleeding, no discharge.  No dysuria or change in bowel habits.  She voices no mood concerns.  She plans to use condoms for contraception.    A/P: This is a 29-year-old now  3 para 2-1-0-3 with a normal postpartum visit after vaginal delivery of a boy on 2024.  She plans to use condoms for contraception.  We discussed follow-up for her routine GYN exam.  Her last Pap in 2023 was negative.    Active Problems:  There are no active Hospital Problems.    Pregnancy Problems (from 10/05/23 to present)       Problem Noted Resolved    Class 2 obesity without serious comorbidity with body mass index (BMI) of 39.0 to 39.9 in adult 2023 by Edilma Bowers MD No    Priority:  Medium      Generalized anxiety disorder 3/2/2023 by Lizz Benavides No    Priority:  Medium      GERD without esophagitis 3/2/2023 by Lizz Benavides No    Priority:  Medium      Irritable bowel syndrome (IBS) 3/2/2023 by Lizz Benavides No    Priority:  Medium      Vitamin D deficiency 3/2/2023 by Lizz Benavides No    Priority:  Medium            Hospital course: no complications       Subjective   Her pain is well controlled with current medications  She is passing flatus  She is ambulating well  She is tolerating a No diet orders on file  She reports no breast or nursing problems  She denies emotional concerns today   Her plan for contraception is condoms       Objective   Allergies:   Penicillins         Last Vitals:  Temp Pulse Resp BP MAP Pulse Ox         116/72         Vitals Min/Max Last 24 Hours:  @FLOWSTAT(6,8,9,5,[id number]:24::1)@    Intake/Output:   [unfilled]    Physical Exam:  General: Examination reveals a well developed, " well nourished, female, in no acute distress. She is alert and cooperative.  Constitutional: Alert and in no acute distress. Well developed, well nourished.   Head and Face: Head and face: Normal.    Eyes: Normal external exam - nonicteric sclera.  Pulmonary: No respiratory distress.   Abdomen: Soft nontender; no abdominal mass palpated. No organomegaly. No hernias.   Genitourinary: External genitalia: Normal. No inguinal lymphadenopathy. Bartholin's Urethral and Skenes Glands: Normal. Urethra: Normal.  Bladder: Normal on palpation. Vagina: Normal. Cervix: Normal.  Uterus: Normal.  Right Adnexa/parametria: Normal.  Left Adnexa/parametria: Normal.  Inspection of Perianal Area: Normal.   Musculoskeletal: No joint swelling seen, normal movements of all extremities.    Neurologic: Non-focal. Grossly intact.   Psychiatric: Alert and oriented x 3. Affect normal to patient baseline. Mood: Appropriate.  Lab Data:  Labs in chart were reviewed.

## 2024-06-14 DIAGNOSIS — F41.1 GENERALIZED ANXIETY DISORDER: ICD-10-CM

## 2024-06-16 RX ORDER — FLUOXETINE HYDROCHLORIDE 20 MG/1
20 CAPSULE ORAL DAILY
Qty: 30 CAPSULE | Refills: 5 | OUTPATIENT
Start: 2024-06-16

## 2024-06-25 ENCOUNTER — OFFICE VISIT (OUTPATIENT)
Dept: PRIMARY CARE | Facility: CLINIC | Age: 29
End: 2024-06-25
Payer: MEDICAID

## 2024-06-25 VITALS
BODY MASS INDEX: 43.55 KG/M2 | HEART RATE: 68 BPM | HEIGHT: 67 IN | WEIGHT: 277.5 LBS | OXYGEN SATURATION: 98 % | SYSTOLIC BLOOD PRESSURE: 108 MMHG | TEMPERATURE: 97.2 F | DIASTOLIC BLOOD PRESSURE: 62 MMHG

## 2024-06-25 DIAGNOSIS — E66.01 CLASS 3 SEVERE OBESITY WITHOUT SERIOUS COMORBIDITY WITH BODY MASS INDEX (BMI) OF 40.0 TO 44.9 IN ADULT, UNSPECIFIED OBESITY TYPE (MULTI): ICD-10-CM

## 2024-06-25 DIAGNOSIS — F41.1 GENERALIZED ANXIETY DISORDER: Primary | ICD-10-CM

## 2024-06-25 PROBLEM — E66.813 CLASS 3 SEVERE OBESITY WITHOUT SERIOUS COMORBIDITY WITH BODY MASS INDEX (BMI) OF 40.0 TO 44.9 IN ADULT: Status: ACTIVE | Noted: 2023-05-08

## 2024-06-25 PROCEDURE — 1036F TOBACCO NON-USER: CPT | Performed by: FAMILY MEDICINE

## 2024-06-25 PROCEDURE — 99213 OFFICE O/P EST LOW 20 MIN: CPT | Performed by: FAMILY MEDICINE

## 2024-06-25 PROCEDURE — 3008F BODY MASS INDEX DOCD: CPT | Performed by: FAMILY MEDICINE

## 2024-06-25 RX ORDER — FLUOXETINE HYDROCHLORIDE 20 MG/1
20 CAPSULE ORAL DAILY
Qty: 90 CAPSULE | Refills: 3 | Status: SHIPPED | OUTPATIENT
Start: 2024-06-25 | End: 2025-06-25

## 2024-06-25 ASSESSMENT — COLUMBIA-SUICIDE SEVERITY RATING SCALE - C-SSRS
1. IN THE PAST MONTH, HAVE YOU WISHED YOU WERE DEAD OR WISHED YOU COULD GO TO SLEEP AND NOT WAKE UP?: NO
2. HAVE YOU ACTUALLY HAD ANY THOUGHTS OF KILLING YOURSELF?: NO
6. HAVE YOU EVER DONE ANYTHING, STARTED TO DO ANYTHING, OR PREPARED TO DO ANYTHING TO END YOUR LIFE?: NO

## 2024-06-25 ASSESSMENT — ENCOUNTER SYMPTOMS
UNEXPECTED WEIGHT CHANGE: 0
SLEEP DISTURBANCE: 0
ABDOMINAL PAIN: 0
SHORTNESS OF BREATH: 0
COUGH: 0
NERVOUS/ANXIOUS: 0
PALPITATIONS: 0
DEPRESSION: 0
FATIGUE: 0

## 2024-06-25 ASSESSMENT — PATIENT HEALTH QUESTIONNAIRE - PHQ9
SUM OF ALL RESPONSES TO PHQ9 QUESTIONS 1 AND 2: 0
1. LITTLE INTEREST OR PLEASURE IN DOING THINGS: NOT AT ALL
2. FEELING DOWN, DEPRESSED OR HOPELESS: NOT AT ALL

## 2024-06-25 NOTE — PATIENT INSTRUCTIONS
Current weight: 126 kg (277 lb 8 oz)  Weight change since last visit (-) denotes wt loss 5.5 lbs   Weight loss needed to achieve BMI 25: 118.2 Lbs  Weight loss needed to achieve BMI 30: 86.4 Lbs

## 2024-06-25 NOTE — PROGRESS NOTES
"Subjective   Patient ID: Ofe Tee is a 29 y.o. female who presents for Med Refill.    Ofe is here to follow up on her anxiety.  She reports her anxiety is controlled on the fluoxetine.  She had her 3rd baby 2 months ago.  Sleep is as good as can be expected.      Med Refill  Pertinent negatives include no abdominal pain, chest pain, coughing or fatigue.       Review of Systems   Constitutional:  Negative for fatigue and unexpected weight change.   Respiratory:  Negative for cough and shortness of breath.    Cardiovascular:  Negative for chest pain and palpitations.   Gastrointestinal:  Negative for abdominal pain.   Psychiatric/Behavioral:  Negative for sleep disturbance. The patient is not nervous/anxious.        Objective     /62   Pulse 68   Temp 36.2 °C (97.2 °F)   Ht 1.702 m (5' 7\")   Wt 126 kg (277 lb 8 oz)   LMP 08/01/2023 (Exact Date)   SpO2 98%   Breastfeeding Yes   BMI 43.46 kg/m²     Physical Exam  Constitutional:       General: She is not in acute distress.     Appearance: She is obese.   Neck:      Thyroid: No thyromegaly.   Cardiovascular:      Rate and Rhythm: Normal rate and regular rhythm.      Heart sounds: No murmur heard.  Pulmonary:      Effort: No respiratory distress.      Breath sounds: Normal breath sounds.   Lymphadenopathy:      Cervical: No cervical adenopathy.   Neurological:      Mental Status: She is alert.   Psychiatric:         Mood and Affect: Mood and affect normal.             Assessment/Plan   Problem List Items Addressed This Visit       Generalized anxiety disorder - Primary    Current Assessment & Plan     Controlled with Prozac, will continue.         Relevant Medications    FLUoxetine (PROzac) 20 mg capsule    Class 3 severe obesity without serious comorbidity with body mass index (BMI) of 40.0 to 44.9 in adult (Multi)             "

## 2024-07-17 NOTE — ASSESSMENT & PLAN NOTE
She is doing well, will continue the same medication.  F/U as needed.   0 = swallows foods/liquids without difficulty

## 2025-05-28 ENCOUNTER — OFFICE VISIT (OUTPATIENT)
Dept: URGENT CARE | Age: 30
End: 2025-05-28
Payer: MEDICAID

## 2025-05-28 ENCOUNTER — HOSPITAL ENCOUNTER (OUTPATIENT)
Dept: RADIOLOGY | Facility: CLINIC | Age: 30
Discharge: HOME | End: 2025-05-28
Payer: MEDICAID

## 2025-05-28 VITALS
DIASTOLIC BLOOD PRESSURE: 84 MMHG | SYSTOLIC BLOOD PRESSURE: 148 MMHG | RESPIRATION RATE: 15 BRPM | HEART RATE: 88 BPM | TEMPERATURE: 97.9 F | OXYGEN SATURATION: 99 %
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
## 2025-05-28 DIAGNOSIS — S80.11XA CONTUSION OF RIGHT LOWER LEG, INITIAL ENCOUNTER: ICD-10-CM

## 2025-05-28 DIAGNOSIS — S89.91XA RIGHT LEG INJURY, INITIAL ENCOUNTER: ICD-10-CM

## 2025-05-28 DIAGNOSIS — S89.91XA RIGHT LEG INJURY, INITIAL ENCOUNTER: Primary | ICD-10-CM

## 2025-05-28 PROCEDURE — 73590 X-RAY EXAM OF LOWER LEG: CPT | Mod: RT

## 2025-05-28 PROCEDURE — 73590 X-RAY EXAM OF LOWER LEG: CPT | Mod: RIGHT SIDE | Performed by: RADIOLOGY

## 2025-05-28 ASSESSMENT — ENCOUNTER SYMPTOMS: ARTHRALGIAS: 1

## 2025-05-28 NOTE — PROGRESS NOTES
Subjective   Patient ID: Ofe Tee is a 30 y.o. female. They present today with a chief complaint of Injury (Right lower leg injury, bruised and swollen X 6 days. ANTONIO).    History of Present Illness  Patient presents with concern for right lower leg injury, states she fell down some steps at a concert. She denies any other injuries of concern. Has been using ice/heat. States it seems to be getting more swollen.           Past Medical History  Allergies as of 05/28/2025 - Reviewed 05/28/2025   Allergen Reaction Noted    Penicillins Shortness of breath and Rash 03/02/2023       Prescriptions Prior to Admission[1]     Medical History[2]    Surgical History[3]     reports that she has never smoked. She has never used smokeless tobacco. She reports that she does not drink alcohol and does not use drugs.    Review of Systems  Review of Systems   Musculoskeletal:  Positive for arthralgias.                                  Objective    Vitals:    05/28/25 1332   BP: 148/84   Pulse: 88   Resp: 15   Temp: 36.6 °C (97.9 °F)   SpO2: 99%     No LMP recorded.    Physical Exam  Vitals reviewed.   Constitutional:       Appearance: Normal appearance.   Cardiovascular:      Rate and Rhythm: Normal rate.   Pulmonary:      Effort: Pulmonary effort is normal.   Musculoskeletal:         General: Swelling and tenderness present. No deformity.      Comments: Swelling, TTP, bruising along anterior shin   Skin:     General: Skin is warm and dry.      Findings: Bruising present.   Neurological:      Mental Status: She is alert.         Procedures    Point of Care Test & Imaging Results from this visit  No results found for this visit on 05/28/25.   Imaging  No results found.    Cardiology, Vascular, and Other Imaging  No other imaging results found for the past 2 days      Diagnostic study results (if any) were reviewed by AMRIT Ortiz.    Assessment/Plan   Allergies, medications, history, and pertinent labs/EKGs/Imaging  reviewed by AMRIT Ortiz.     Medical Decision Making  Advised no fracture noted on preliminary impression of xray. Advised will call if final impression leads to change in plan of care. Advised contusion, rest, elevation, ice, gentle compression. F/up if symptoms change, get worse with increased pain or swelling.    At time of discharge patient was clinically well-appearing and HDS for outpatient management. The patient and/or family was educated regarding diagnosis, supportive care, OTC and Rx medications. The patient and/or family was given the opportunity to ask questions prior to discharge.  They verbalized understanding of my discussion of the plans for treatment, expected course, indications to return to  or seek further evaluation in ED, and the need for timely follow up as directed.   They were provided with a work/school excuse if requested.      Orders and Diagnoses  Diagnoses and all orders for this visit:  Right leg injury, initial encounter  -     XR tibia fibula right 2 views; Future  Fall, initial encounter  -     XR tibia fibula right 2 views; Future  Contusion of right lower leg, initial encounter      Medical Admin Record      Patient disposition: Home    Electronically signed by AMRIT Ortiz  1:42 PM           [1] (Not in a hospital admission)   [2]   Past Medical History:  Diagnosis Date    Allergy status to unspecified drugs, medicaments and biological substances     History of seasonal allergies    Bacterial vaginosis 03/02/2023    COVID-19 11/02/2020    COVID-19    Personal history of other diseases of the respiratory system 01/15/2016    History of streptococcal pharyngitis   [3] No past surgical history on file.

## 2025-06-06 ENCOUNTER — OFFICE VISIT (OUTPATIENT)
Dept: PRIMARY CARE | Facility: CLINIC | Age: 30
End: 2025-06-06
Payer: MEDICAID

## 2025-06-06 DIAGNOSIS — Z00.00 ROUTINE GENERAL MEDICAL EXAMINATION AT A HEALTH CARE FACILITY: ICD-10-CM

## 2025-06-20 ENCOUNTER — APPOINTMENT (OUTPATIENT)
Dept: PRIMARY CARE | Facility: CLINIC | Age: 30
End: 2025-06-20
Payer: MEDICAID

## 2025-06-28 DIAGNOSIS — F41.1 GENERALIZED ANXIETY DISORDER: ICD-10-CM

## 2025-06-30 ENCOUNTER — APPOINTMENT (OUTPATIENT)
Dept: PRIMARY CARE | Facility: CLINIC | Age: 30
End: 2025-06-30
Payer: MEDICAID

## 2025-06-30 VITALS
WEIGHT: 292.2 LBS | HEIGHT: 68 IN | BODY MASS INDEX: 44.28 KG/M2 | DIASTOLIC BLOOD PRESSURE: 83 MMHG | OXYGEN SATURATION: 99 % | HEART RATE: 91 BPM | SYSTOLIC BLOOD PRESSURE: 137 MMHG

## 2025-06-30 DIAGNOSIS — Z00.00 ROUTINE GENERAL MEDICAL EXAMINATION AT A HEALTH CARE FACILITY: Primary | ICD-10-CM

## 2025-06-30 DIAGNOSIS — E55.9 VITAMIN D DEFICIENCY: ICD-10-CM

## 2025-06-30 DIAGNOSIS — E66.813 CLASS 3 SEVERE OBESITY WITHOUT SERIOUS COMORBIDITY WITH BODY MASS INDEX (BMI) OF 40.0 TO 44.9 IN ADULT, UNSPECIFIED OBESITY TYPE: ICD-10-CM

## 2025-06-30 DIAGNOSIS — F41.1 GENERALIZED ANXIETY DISORDER: ICD-10-CM

## 2025-06-30 DIAGNOSIS — R73.02 IGT (IMPAIRED GLUCOSE TOLERANCE): ICD-10-CM

## 2025-06-30 PROCEDURE — 3008F BODY MASS INDEX DOCD: CPT | Performed by: FAMILY MEDICINE

## 2025-06-30 PROCEDURE — 99395 PREV VISIT EST AGE 18-39: CPT | Performed by: FAMILY MEDICINE

## 2025-06-30 RX ORDER — FLUOXETINE 20 MG/1
20 CAPSULE ORAL DAILY
Qty: 90 CAPSULE | Refills: 3 | Status: SHIPPED | OUTPATIENT
Start: 2025-06-30 | End: 2025-06-30 | Stop reason: ALTCHOICE

## 2025-06-30 RX ORDER — BUPROPION HYDROCHLORIDE 100 MG/1
100 TABLET ORAL 2 TIMES DAILY
Qty: 60 TABLET | Refills: 0 | Status: SHIPPED | OUTPATIENT
Start: 2025-06-30 | End: 2026-06-30

## 2025-06-30 ASSESSMENT — ENCOUNTER SYMPTOMS
DEPRESSION: 0
LOSS OF SENSATION IN FEET: 0
OCCASIONAL FEELINGS OF UNSTEADINESS: 0

## 2025-06-30 NOTE — PROGRESS NOTES
"Patient ID: Ofe Tee [id number] is a 30 y.o. female who presents for Routine Physical.       Pt is here to establish care  Pt has 3 kids , weight has been consistently increasing since she had 3rd child 1.5 years ago.  Pt had Impaired Glucose tolerance during 3rd pregnancy  Diet- trying eat Breakfast and Lunch only  Exercise : weight lifting and Cardio - [place] 5 days a week for 1 hour  Medication- On Prozac 20 mg for Anxiety for  2 years.  Was on zoloft( did not do well) , on Buspar( did not help)   Supplements- magnesium, MVI  Denies smoking/ alcohol or drug use.   Immunization   Flu   , TDAp 2021  Love eating Popcorn, Ice cream, Rice  Pap Smear 1.5 years ago  Normal menses - LMP June 2nd    Immunization History   Administered Date(s) Administered    HPV 9-valent vaccine (GARDASIL 9) 11/07/2016    HPV, Quadrivalent 04/17/2010    Hepatitis A vaccine, pediatric/adolescent (HAVRIX, VAQTA) 04/17/2010    Tdap vaccine, age 7 year and older (BOOSTRIX, ADACEL) 11/02/2021    Varicella vaccine, subcutaneous (VARIVAX) 04/17/2010       Allergies[1]    Current Medications[2]    Medical History[3]  Social History[4]  Family History[5]    /83   Pulse 91   Ht 1.727 m (5' 8\")   Wt 133 kg (292 lb 3.2 oz)   SpO2 99%   BMI 44.43 kg/m²   Review of System  Constitutional:  Negative for appetite change, chills, fatigue, fever and unexpected weight change.   HENT:  Negative for hearing loss and voice change.    Eyes:  Negative for visual disturbance.   Respiratory:  Negative for cough, chest tightness, shortness of breath and wheezing.    Cardiovascular:  Negative for chest pain and leg swelling.   Gastrointestinal:  Negative for abdominal pain, blood in stool, constipation and diarrhea.   Genitourinary:  Negative for difficulty urinating and dysuria.   Musculoskeletal:  Negative for arthralgias and joint swelling.   Skin:  Negative for rash.   Neurological:  Negative for dizziness, weakness, numbness and " "headaches.   Psychiatric/Behavioral:  Negative for behavioral problems and confusion.    /83   Pulse 91   Ht 1.727 m (5' 8\")   Wt 133 kg (292 lb 3.2 oz)   SpO2 99%   BMI 44.43 kg/m²     General Appearance:    Alert, cooperative, no distress, appears stated age   Head:    Normocephalic, without obvious abnormality, atraumatic   Eyes:    PERRL, conjunctiva/corneas clear, EOM's intact,    Neck:   Supple, symmetrical, No enlargement   Back:     Symmetric, no curvature, ROM normal, no CVA tenderness   Lungs:     Clear to auscultation bilaterally, respirations normal ,no wheezing or ronchi   Chest Wall:    No tenderness or deformity   Abdomen:     Soft, non-tender, bowel sounds active all four quadrants, no masses,   no organomegaly   Extremities:   Extremities normal, atraumatic, no cyanosis or edema   Pulses:   2+ and symmetric all extremities   Skin:   Skin color, texture, turgor normal, no rashes or lesions   Lymph nodes:   Cervical nodes normal   Neurologic:    normal strength, sensation and reflexes.      No visits with results within 3 Month(s) from this visit.   Latest known visit with results is:   Admission on 04/27/2024, Discharged on 04/29/2024   Component Date Value Ref Range Status    ABO TYPE 04/27/2024 O   Final    Rh TYPE 04/27/2024 POS   Final    ANTIBODY SCREEN 04/27/2024 NEG   Final    Syphilis Total Ab 04/27/2024 Nonreactive  Nonreactive Final    No significant level of Treponema pallidum antibody detected.   Repeat testing in 2 to 4 weeks may be considered if early   infection or incubating syphilis infection is suspected.    WBC 04/27/2024 11.0  4.4 - 11.3 x10*3/uL Final    nRBC 04/27/2024 0.0  0.0 - 0.0 /100 WBCs Final    RBC 04/27/2024 4.06  4.00 - 5.20 x10*6/uL Final    Hemoglobin 04/27/2024 12.6  12.0 - 16.0 g/dL Final    Hematocrit 04/27/2024 36.9  36.0 - 46.0 % Final    MCV 04/27/2024 91  80 - 100 fL Final    MCH 04/27/2024 31.0  26.0 - 34.0 pg Final    MCHC 04/27/2024 34.1  32.0 - " 36.0 g/dL Final    RDW 04/27/2024 13.2  11.5 - 14.5 % Final    Platelets 04/27/2024 205  150 - 450 x10*3/uL Final       Assessment/Plan   Diagnoses and all orders for this visit:  Routine general medical examination at a health care facility  -     CBC and Auto Differential; Future  -     Comprehensive Metabolic Panel; Future  -     Lipid Panel; Future  Class 3 severe obesity without serious comorbidity with body mass index (BMI) of 40.0 to 44.9 in adult, unspecified obesity type  Generalized anxiety disorder  -     TSH with reflex to Free T4 if abnormal; Future  -     Vitamin B12; Future  -     Triiodothyronine, Free; Future  -     buPROPion (Wellbutrin) 100 mg tablet; Take 1 tablet (100 mg) by mouth 2 times a day.  Vitamin D deficiency  -     Vitamin D 25-Hydroxy,Total (for eval of Vitamin D levels); Future  IGT (impaired glucose tolerance)    Patient was advised to follow healthy diet and do daily exercise.  We did discuss about calorie counting portion control and calorie burnout  Patient was advised to get blood work  Fluoxetine or Prozac may be the cause of weight gain discussed.    Will DC fluoxetine and   start Wellbutrin 100 twice daily for anxiety    Will reevaluate in 2 to 3 weeks    I spent 15 minutes on obesity councelling. Pt was advised to loose weight. Discussed at length about various ways of loosing weight including healthy Diet, daily Aerobic exercise, calorie counting bariatric surgery, calorie deficit with portion control method, and medications. recommend patient maintain a diet of  1500calories.             [1]   Allergies  Allergen Reactions    Penicillins Shortness of breath and Rash   [2]   Current Outpatient Medications   Medication Sig Dispense Refill    FLUoxetine (PROzac) 20 mg capsule TAKE 1 CAPSULE BY MOUTH EVERY DAY 90 capsule 3    magnesium 250 mg tablet Take 1 tablet (250 mg) by mouth once daily.      multivitamin tablet Take 1 tablet by mouth once daily.      acetaminophen  (Tylenol) 325 mg tablet Take 3 tablets (975 mg) by mouth every 6 hours. (Patient not taking: Reported on 6/30/2025) 60 tablet 2    ferrous gluconate 256 mg (28 mg iron) tablet Take 1 tablet (28 mg) by mouth once daily. (Patient not taking: Reported on 6/30/2025)      ibuprofen 600 mg tablet Take 1 tablet (600 mg) by mouth every 6 hours. (Patient not taking: Reported on 6/30/2025) 60 tablet 2    polyethylene glycol (Glycolax, Miralax) 17 gram packet Take 17 g by mouth 2 times a day as needed (first line). (Patient not taking: Reported on 6/30/2025) 3 packet 2     No current facility-administered medications for this visit.   [3]   Past Medical History:  Diagnosis Date    Allergy status to unspecified drugs, medicaments and biological substances     History of seasonal allergies    Anxiety     Bacterial vaginosis 03/02/2023    COVID-19 11/02/2020    COVID-19    Personal history of other diseases of the respiratory system 01/15/2016    History of streptococcal pharyngitis   [4]   Social History  Tobacco Use    Smoking status: Never    Smokeless tobacco: Never   Vaping Use    Vaping status: Never Used   Substance Use Topics    Alcohol use: Never    Drug use: Never   [5]   Family History  Problem Relation Name Age of Onset    Diabetes Father      Hypertension Father      Hypertension Paternal Grandmother

## 2025-06-30 NOTE — PROGRESS NOTES
"Subjective   Patient ID: Ofe Tee is a 30 y.o. female [id number] who presents for New pt visit.     HPI             Immunization History   Administered Date(s) Administered    HPV 9-valent vaccine (GARDASIL 9) 11/07/2016    HPV, Quadrivalent 04/17/2010    Hepatitis A vaccine, pediatric/adolescent (HAVRIX, VAQTA) 04/17/2010    Tdap vaccine, age 7 year and older (BOOSTRIX, ADACEL) 11/02/2021    Varicella vaccine, subcutaneous (VARIVAX) 04/17/2010       Medical History[1]    Social History[2]    Family History[3]    Recent Surgeries in Family Medicine            No cases to display            Current Medications[4]    Physical Exam  /83   Pulse 91   Ht 1.727 m (5' 8\")   Wt 133 kg (292 lb 3.2 oz)   SpO2 99%   BMI 44.43 kg/m²     Allergies[5]    General Appearance:  Alert, cooperative, no distress,   Head:  Normocephalic, atraumatic   Eyes:  PERRL, conjunctiva/corneas clear, EOM's intact,    Lungs:   Clear to auscultation bilaterally, respirations unlabored   Heart:  Regular rate and rhythm, S1 and S2 normal, no murmur,    Abdomen:   Soft, non-tender, bowel sounds active all four quadrants,  no masses, no organomegaly   Extremities: Extremities normal, No edema   Neurologic: Normal        Assessment/Plan   {Assess/PlanSmartLinks:68290}         [1]   Past Medical History:  Diagnosis Date    Allergy status to unspecified drugs, medicaments and biological substances     History of seasonal allergies    Anxiety     Bacterial vaginosis 03/02/2023    COVID-19 11/02/2020    COVID-19    Personal history of other diseases of the respiratory system 01/15/2016    History of streptococcal pharyngitis   [2]   Social History  Tobacco Use    Smoking status: Never    Smokeless tobacco: Never   Vaping Use    Vaping status: Never Used   Substance Use Topics    Alcohol use: Never    Drug use: Never   [3]   Family History  Problem Relation Name Age of Onset    Diabetes Father      Hypertension Father      Hypertension " Paternal Grandmother     [4]   Current Outpatient Medications   Medication Sig Dispense Refill    FLUoxetine (PROzac) 20 mg capsule TAKE 1 CAPSULE BY MOUTH EVERY DAY 90 capsule 3    magnesium 250 mg tablet Take 1 tablet (250 mg) by mouth once daily.      multivitamin tablet Take 1 tablet by mouth once daily.      acetaminophen (Tylenol) 325 mg tablet Take 3 tablets (975 mg) by mouth every 6 hours. (Patient not taking: Reported on 6/30/2025) 60 tablet 2    ferrous gluconate 256 mg (28 mg iron) tablet Take 1 tablet (28 mg) by mouth once daily. (Patient not taking: Reported on 6/30/2025)      ibuprofen 600 mg tablet Take 1 tablet (600 mg) by mouth every 6 hours. (Patient not taking: Reported on 6/30/2025) 60 tablet 2    polyethylene glycol (Glycolax, Miralax) 17 gram packet Take 17 g by mouth 2 times a day as needed (first line). (Patient not taking: Reported on 6/30/2025) 3 packet 2     No current facility-administered medications for this visit.   [5]   Allergies  Allergen Reactions    Penicillins Shortness of breath and Rash

## 2025-07-01 ENCOUNTER — TELEPHONE (OUTPATIENT)
Dept: PRIMARY CARE | Facility: CLINIC | Age: 30
End: 2025-07-01
Payer: MEDICAID

## 2025-07-01 LAB
25(OH)D3+25(OH)D2 SERPL-MCNC: 43 NG/ML (ref 30–100)
ALBUMIN SERPL-MCNC: 4.2 G/DL (ref 3.6–5.1)
ALP SERPL-CCNC: 51 U/L (ref 31–125)
ALT SERPL-CCNC: 17 U/L (ref 6–29)
ANION GAP SERPL CALCULATED.4IONS-SCNC: 7 MMOL/L (CALC) (ref 7–17)
AST SERPL-CCNC: 17 U/L (ref 10–30)
BASOPHILS # BLD AUTO: 37 CELLS/UL (ref 0–200)
BASOPHILS NFR BLD AUTO: 0.5 %
BILIRUB SERPL-MCNC: 0.2 MG/DL (ref 0.2–1.2)
BUN SERPL-MCNC: 20 MG/DL (ref 7–25)
CALCIUM SERPL-MCNC: 9.2 MG/DL (ref 8.6–10.2)
CHLORIDE SERPL-SCNC: 102 MMOL/L (ref 98–110)
CHOLEST SERPL-MCNC: 175 MG/DL
CHOLEST/HDLC SERPL: 4.2 (CALC)
CO2 SERPL-SCNC: 28 MMOL/L (ref 20–32)
CREAT SERPL-MCNC: 0.63 MG/DL (ref 0.5–0.96)
EGFRCR SERPLBLD CKD-EPI 2021: 123 ML/MIN/1.73M2
EOSINOPHIL # BLD AUTO: 178 CELLS/UL (ref 15–500)
EOSINOPHIL NFR BLD AUTO: 2.4 %
ERYTHROCYTE [DISTWIDTH] IN BLOOD BY AUTOMATED COUNT: 13.6 % (ref 11–15)
EST. AVERAGE GLUCOSE BLD GHB EST-MCNC: 111 MG/DL
EST. AVERAGE GLUCOSE BLD GHB EST-SCNC: 6.2 MMOL/L
GLUCOSE SERPL-MCNC: 85 MG/DL (ref 65–139)
HBA1C MFR BLD: 5.5 %
HCT VFR BLD AUTO: 42.5 % (ref 35–45)
HDLC SERPL-MCNC: 42 MG/DL
HGB BLD-MCNC: 13.4 G/DL (ref 11.7–15.5)
LDLC SERPL CALC-MCNC: 101 MG/DL (CALC)
LYMPHOCYTES # BLD AUTO: 1835 CELLS/UL (ref 850–3900)
LYMPHOCYTES NFR BLD AUTO: 24.8 %
MCH RBC QN AUTO: 30 PG (ref 27–33)
MCHC RBC AUTO-ENTMCNC: 31.5 G/DL (ref 32–36)
MCV RBC AUTO: 95.1 FL (ref 80–100)
MONOCYTES # BLD AUTO: 511 CELLS/UL (ref 200–950)
MONOCYTES NFR BLD AUTO: 6.9 %
NEUTROPHILS # BLD AUTO: 4840 CELLS/UL (ref 1500–7800)
NEUTROPHILS NFR BLD AUTO: 65.4 %
NONHDLC SERPL-MCNC: 133 MG/DL (CALC)
PLATELET # BLD AUTO: 286 THOUSAND/UL (ref 140–400)
PMV BLD REES-ECKER: 10.1 FL (ref 7.5–12.5)
POTASSIUM SERPL-SCNC: 4.3 MMOL/L (ref 3.5–5.3)
PROT SERPL-MCNC: 6.9 G/DL (ref 6.1–8.1)
RBC # BLD AUTO: 4.47 MILLION/UL (ref 3.8–5.1)
SODIUM SERPL-SCNC: 137 MMOL/L (ref 135–146)
T3FREE SERPL-MCNC: 3.1 PG/ML (ref 2.3–4.2)
TRIGL SERPL-MCNC: 201 MG/DL
TSH SERPL-ACNC: 1.69 MIU/L
VIT B12 SERPL-MCNC: 428 PG/ML (ref 200–1100)
WBC # BLD AUTO: 7.4 THOUSAND/UL (ref 3.8–10.8)

## 2025-07-01 NOTE — TELEPHONE ENCOUNTER
----- Message from Stacey Caldera sent at 7/1/2025 12:03 PM EDT -----  Labs with elevated cholesterol. Pt should eat green leafy vegetables and fruits and eat lean meat like chicken, Fish and eggs. Cut down in frequency and amount of Pizza, pasta, noodles, ice creams,   cookies and sugary drinks like Soda and energy drinks. Advise to walk/ exercise daily for 30 mins 5 days a week.    ----- Message -----  From: Randy Fry Results In  Sent: 7/1/2025   4:37 AM EDT  To: Stacey Caldera MD

## 2025-07-02 ENCOUNTER — APPOINTMENT (OUTPATIENT)
Dept: PRIMARY CARE | Facility: CLINIC | Age: 30
End: 2025-07-02
Payer: MEDICAID

## 2025-07-03 ENCOUNTER — TELEPHONE (OUTPATIENT)
Dept: PRIMARY CARE | Facility: CLINIC | Age: 30
End: 2025-07-03
Payer: MEDICAID

## 2025-07-03 NOTE — TELEPHONE ENCOUNTER
Pt called stating at LOV she was switched from Prozac 40mg to Wellbutrin and read that Prozac should be tapered down and then switched. Wants to know she should expect any interactions. Please advise

## 2025-07-08 ENCOUNTER — OFFICE VISIT (OUTPATIENT)
Dept: PRIMARY CARE | Facility: CLINIC | Age: 30
End: 2025-07-08
Payer: MEDICAID

## 2025-07-08 VITALS
BODY MASS INDEX: 44.31 KG/M2 | HEART RATE: 75 BPM | WEIGHT: 292.4 LBS | SYSTOLIC BLOOD PRESSURE: 121 MMHG | DIASTOLIC BLOOD PRESSURE: 67 MMHG | OXYGEN SATURATION: 99 % | HEIGHT: 68 IN

## 2025-07-08 DIAGNOSIS — F41.1 GENERALIZED ANXIETY DISORDER: Primary | ICD-10-CM

## 2025-07-08 PROCEDURE — 1036F TOBACCO NON-USER: CPT | Performed by: FAMILY MEDICINE

## 2025-07-08 PROCEDURE — 3008F BODY MASS INDEX DOCD: CPT | Performed by: FAMILY MEDICINE

## 2025-07-08 PROCEDURE — 99213 OFFICE O/P EST LOW 20 MIN: CPT | Performed by: FAMILY MEDICINE

## 2025-07-08 RX ORDER — FLUOXETINE 20 MG/1
20 CAPSULE ORAL DAILY
COMMUNITY
End: 2025-07-08 | Stop reason: ALTCHOICE

## 2025-07-08 RX ORDER — FLUOXETINE 20 MG/1
20 TABLET ORAL DAILY
Qty: 30 TABLET | Refills: 1 | Status: SHIPPED | OUTPATIENT
Start: 2025-07-08 | End: 2025-09-06

## 2025-07-08 NOTE — PROGRESS NOTES
"Subjective   Patient ID: Ofe Tee [id number] is a 30 y.o. female who presents for Follow-up (Follow up discuss meds).    HPI   Patient is here for medication follow-up.  Patient was advised to start Wellbutrin.  Patient is afraid to go off Prozac completely.  Would like to reduce Prozac to half tablet daily and take Wellbutrin.  Patient is working on diet and exercise.    Previous History  Pt has 3 kids , weight has been consistently increasing since she had 3rd child 1.5 years ago.  Pt had Impaired Glucose tolerance during 3rd pregnancy  Diet- trying eat Breakfast and Lunch only  Exercise : weight lifting and Cardio - [place] 5 days a week for 1 hour  Medication- On Prozac 20 mg for Anxiety for  2 years.  Was on zoloft( did not do well) , on Buspar( did not help)    Social History[1]    Allergies[2]    Current Medications[3]    Physical Exam  /67   Pulse 75   Ht 1.727 m (5' 8\")   Wt 133 kg (292 lb 6.4 oz)   SpO2 99%   BMI 44.46 kg/m²     General Appearance:  Alert, cooperative, no distress,   Head:  Normocephalic, atraumatic   Eyes:  PERRL, conjunctiva/corneas clear, EOM's intact,    Lungs:   Clear to auscultation bilaterally, respirations unlabored   Heart:  Regular rate and rhythm, S1 and S2 normal, no murmur,    Neurologic: Normal      Office Visit on 06/30/2025   Component Date Value Ref Range Status    WHITE BLOOD CELL COUNT 06/30/2025 7.4  3.8 - 10.8 Thousand/uL Final    RED BLOOD CELL COUNT 06/30/2025 4.47  3.80 - 5.10 Million/uL Final    HEMOGLOBIN 06/30/2025 13.4  11.7 - 15.5 g/dL Final    HEMATOCRIT 06/30/2025 42.5  35.0 - 45.0 % Final    MCV 06/30/2025 95.1  80.0 - 100.0 fL Final    MCH 06/30/2025 30.0  27.0 - 33.0 pg Final    MCHC 06/30/2025 31.5 (L)  32.0 - 36.0 g/dL Final    Comment: For adults, a slight decrease in the calculated MCHC  value (in the range of 30 to 32 g/dL) is most likely  not clinically significant; however, it should be  interpreted with caution in " correlation with other  red cell parameters and the patient's clinical  condition.      RDW 06/30/2025 13.6  11.0 - 15.0 % Final    PLATELET COUNT 06/30/2025 286  140 - 400 Thousand/uL Final    MPV 06/30/2025 10.1  7.5 - 12.5 fL Final    ABSOLUTE NEUTROPHILS 06/30/2025 4,840  1,500 - 7,800 cells/uL Final    ABSOLUTE LYMPHOCYTES 06/30/2025 1,835  850 - 3,900 cells/uL Final    ABSOLUTE MONOCYTES 06/30/2025 511  200 - 950 cells/uL Final    ABSOLUTE EOSINOPHILS 06/30/2025 178  15 - 500 cells/uL Final    ABSOLUTE BASOPHILS 06/30/2025 37  0 - 200 cells/uL Final    NEUTROPHILS 06/30/2025 65.4  % Final    LYMPHOCYTES 06/30/2025 24.8  % Final    MONOCYTES 06/30/2025 6.9  % Final    EOSINOPHILS 06/30/2025 2.4  % Final    BASOPHILS 06/30/2025 0.5  % Final    GLUCOSE 06/30/2025 85  65 - 139 mg/dL Final    Comment:           Non-fasting reference interval         UREA NITROGEN (BUN) 06/30/2025 20  7 - 25 mg/dL Final    CREATININE 06/30/2025 0.63  0.50 - 0.96 mg/dL Final    EGFR 06/30/2025 123  > OR = 60 mL/min/1.73m2 Final    SODIUM 06/30/2025 137  135 - 146 mmol/L Final    POTASSIUM 06/30/2025 4.3  3.5 - 5.3 mmol/L Final    CHLORIDE 06/30/2025 102  98 - 110 mmol/L Final    CARBON DIOXIDE 06/30/2025 28  20 - 32 mmol/L Final    ELECTROLYTE BALANCE 06/30/2025 7  7 - 17 mmol/L (calc) Final    CALCIUM 06/30/2025 9.2  8.6 - 10.2 mg/dL Final    PROTEIN, TOTAL 06/30/2025 6.9  6.1 - 8.1 g/dL Final    ALBUMIN 06/30/2025 4.2  3.6 - 5.1 g/dL Final    BILIRUBIN, TOTAL 06/30/2025 0.2  0.2 - 1.2 mg/dL Final    ALKALINE PHOSPHATASE 06/30/2025 51  31 - 125 U/L Final    AST 06/30/2025 17  10 - 30 U/L Final    ALT 06/30/2025 17  6 - 29 U/L Final    CHOLESTEROL, TOTAL 06/30/2025 175  <200 mg/dL Final    HDL CHOLESTEROL 06/30/2025 42 (L)  > OR = 50 mg/dL Final    TRIGLYCERIDES 06/30/2025 201 (H)  <150 mg/dL Final    Comment:    If a non-fasting specimen was collected, consider  repeat triglyceride testing on a fasting specimen  if clinically  indicated.   Jules velazquez al. J. of Clin. Lipidol. 2015;9:129-169.         LDL-CHOLESTEROL 06/30/2025 101 (H)  mg/dL (calc) Final    Comment: Reference range: <100     Desirable range <100 mg/dL for primary prevention;    <70 mg/dL for patients with CHD or diabetic patients   with > or = 2 CHD risk factors.     LDL-C is now calculated using the Junior-Reis   calculation, which is a validated novel method providing   better accuracy than the Friedewald equation in the   estimation of LDL-C.   Junior SS et al. COLLEEN. 2013;310(19): 1473-8032   (http://education.[employer]/faq/MQI352)      CHOL/HDLC RATIO 06/30/2025 4.2  <5.0 (calc) Final    NON HDL CHOLESTEROL 06/30/2025 133 (H)  <130 mg/dL (calc) Final    Comment: For patients with diabetes plus 1 major ASCVD risk   factor, treating to a non-HDL-C goal of <100 mg/dL   (LDL-C of <70 mg/dL) is considered a therapeutic   option.      TSH W/REFLEX TO FT4 06/30/2025 1.69  mIU/L Final    Comment:           Reference Range                         > or = 20 Years  0.40-4.50                              Pregnancy Ranges            First trimester    0.26-2.66            Second trimester   0.55-2.73            Third trimester    0.43-2.91      VITAMIN D,25-OH,TOTAL,IA 06/30/2025 43  30 - 100 ng/mL Final    Comment: Vitamin D Status         25-OH Vitamin D:     Deficiency:                    <20 ng/mL  Insufficiency:             20 - 29 ng/mL  Optimal:                 > or = 30 ng/mL     For 25-OH Vitamin D testing on patients on   D2-supplementation and patients for whom quantitation   of D2 and D3 fractions is required, the QuestAssureD()  25-OH VIT D, (D2,D3), LC/MS/MS is recommended: order   code 14366 (patients >2yrs).     See Note 1     Note 1     For additional information, please refer to   http://education.[employer]/faq/JKM897   (This link is being provided for informational/  educational purposes only.)      VITAMIN B12 06/30/2025 428  200 -  1,100 pg/mL Final    HEMOGLOBIN A1c 06/30/2025 5.5  <5.7 % Final    Comment: For the purpose of screening for the presence of  diabetes:     <5.7%       Consistent with the absence of diabetes  5.7-6.4%    Consistent with increased risk for diabetes              (prediabetes)  > or =6.5%  Consistent with diabetes     This assay result is consistent with a decreased risk  of diabetes.     Currently, no consensus exists regarding use of  hemoglobin A1c for diagnosis of diabetes in children.     According to American Diabetes Association (ADA)  guidelines, hemoglobin A1c <7.0% represents optimal  control in non-pregnant diabetic patients. Different  metrics may apply to specific patient populations.   Standards of Medical Care in Diabetes(ADA).          eAG (mg/dL) 06/30/2025 111  mg/dL Final    eAG (mmol/L) 06/30/2025 6.2  mmol/L Final    T3, FREE 06/30/2025 3.1  2.3 - 4.2 pg/mL Final       Assessment/Plan   Diagnoses and all orders for this visit:  Generalized anxiety disorder  -     FLUoxetine (PROzac) 20 mg tablet; Take 1 tablet (20 mg) by mouth once daily.    Patient was advised to reduce Prozac to 20 mg daily.    Advised to start Wellbutrin 100 twice daily   continue diet and exercise follow-up 1 month       [1]   Social History  Tobacco Use    Smoking status: Never    Smokeless tobacco: Never   Vaping Use    Vaping status: Never Used   Substance Use Topics    Alcohol use: Never    Drug use: Never   [2]   Allergies  Allergen Reactions    Penicillins Shortness of breath and Rash   [3]   Current Outpatient Medications   Medication Sig Dispense Refill    magnesium 250 mg tablet Take 1 tablet (250 mg) by mouth once daily.      multivitamin tablet Take 1 tablet by mouth once daily.      acetaminophen (Tylenol) 325 mg tablet Take 3 tablets (975 mg) by mouth every 6 hours. (Patient not taking: Reported on 7/8/2025) 60 tablet 2    buPROPion (Wellbutrin) 100 mg tablet Take 1 tablet (100 mg) by mouth 2 times a day.  (Patient not taking: Reported on 7/8/2025) 60 tablet 0    ferrous gluconate 256 mg (28 mg iron) tablet Take 1 tablet (28 mg) by mouth once daily. (Patient not taking: Reported on 7/8/2025)      FLUoxetine (PROzac) 20 mg tablet Take 1 tablet (20 mg) by mouth once daily. 30 tablet 1    ibuprofen 600 mg tablet Take 1 tablet (600 mg) by mouth every 6 hours. (Patient not taking: Reported on 7/8/2025) 60 tablet 2    polyethylene glycol (Glycolax, Miralax) 17 gram packet Take 17 g by mouth 2 times a day as needed (first line). (Patient not taking: Reported on 7/8/2025) 3 packet 2     No current facility-administered medications for this visit.

## 2025-07-23 ENCOUNTER — OFFICE VISIT (OUTPATIENT)
Dept: PRIMARY CARE | Facility: CLINIC | Age: 30
End: 2025-07-23
Payer: MEDICAID

## 2025-07-23 VITALS
WEIGHT: 293 LBS | OXYGEN SATURATION: 100 % | HEIGHT: 68 IN | SYSTOLIC BLOOD PRESSURE: 120 MMHG | DIASTOLIC BLOOD PRESSURE: 50 MMHG | BODY MASS INDEX: 44.41 KG/M2 | HEART RATE: 78 BPM

## 2025-07-23 DIAGNOSIS — E66.813 CLASS 3 SEVERE OBESITY WITHOUT SERIOUS COMORBIDITY WITH BODY MASS INDEX (BMI) OF 40.0 TO 44.9 IN ADULT, UNSPECIFIED OBESITY TYPE: Primary | ICD-10-CM

## 2025-07-23 DIAGNOSIS — F41.1 GENERALIZED ANXIETY DISORDER: ICD-10-CM

## 2025-07-23 RX ORDER — FLUOXETINE 20 MG/1
TABLET ORAL
Qty: 180 TABLET | Refills: 1 | Status: SHIPPED | OUTPATIENT
Start: 2025-07-23

## 2025-07-23 ASSESSMENT — PATIENT HEALTH QUESTIONNAIRE - PHQ9
1. LITTLE INTEREST OR PLEASURE IN DOING THINGS: NOT AT ALL
2. FEELING DOWN, DEPRESSED OR HOPELESS: NOT AT ALL
SUM OF ALL RESPONSES TO PHQ9 QUESTIONS 1 AND 2: 0

## 2025-07-23 NOTE — PROGRESS NOTES
"Subjective   Patient ID: Ofe Tee [id number] is a 30 y.o. female who presents for Follow-up (Follow up ).    HPI   Patient is here for medication follow-up.  Patient was advised to start Wellbutrin.  Patient is afraid to go off Prozac completely.  Patient tried reducing Prozac to half tablet daily.  Was feeling worse.  Was noticing more side effect with Wellbutrin including worsening headache palpitation and dilated eye.  Patient does not want to take Wellbutrin.  Would like to continue with Prozac.  Will increase dose to 40 mg daily      Previous History  Pt has 3 kids , weight has been consistently increasing since she had 3rd child 1.5 years ago.  Pt had Impaired Glucose tolerance during 3rd pregnancy  Diet- trying eat Breakfast and Lunch only  Exercise : weight lifting and Cardio - [place] 5 days a week for 1 hour  Medication- On Prozac 20 mg for Anxiety for  2 years.  Was on zoloft( did not do well) , on Buspar( did not help)    Social History[1]    Allergies[2]    Current Medications[3]    Physical Exam  /50   Pulse 78   Ht 1.727 m (5' 8\")   Wt 133 kg (293 lb 6.4 oz)   SpO2 100%   BMI 44.61 kg/m²     General Appearance:  Alert, cooperative, no distress,   Head:  Normocephalic, atraumatic   Eyes:  PERRL, conjunctiva/corneas clear, EOM's intact,    Lungs:   Clear to auscultation bilaterally, respirations unlabored   Heart:  Regular rate and rhythm, S1 and S2 normal, no murmur,    Neurologic: Normal      Office Visit on 06/30/2025   Component Date Value Ref Range Status    WHITE BLOOD CELL COUNT 06/30/2025 7.4  3.8 - 10.8 Thousand/uL Final    RED BLOOD CELL COUNT 06/30/2025 4.47  3.80 - 5.10 Million/uL Final    HEMOGLOBIN 06/30/2025 13.4  11.7 - 15.5 g/dL Final    HEMATOCRIT 06/30/2025 42.5  35.0 - 45.0 % Final    MCV 06/30/2025 95.1  80.0 - 100.0 fL Final    MCH 06/30/2025 30.0  27.0 - 33.0 pg Final    MCHC 06/30/2025 31.5 (L)  32.0 - 36.0 g/dL Final    Comment: For adults, a slight " decrease in the calculated MCHC  value (in the range of 30 to 32 g/dL) is most likely  not clinically significant; however, it should be  interpreted with caution in correlation with other  red cell parameters and the patient's clinical  condition.      RDW 06/30/2025 13.6  11.0 - 15.0 % Final    PLATELET COUNT 06/30/2025 286  140 - 400 Thousand/uL Final    MPV 06/30/2025 10.1  7.5 - 12.5 fL Final    ABSOLUTE NEUTROPHILS 06/30/2025 4,840  1,500 - 7,800 cells/uL Final    ABSOLUTE LYMPHOCYTES 06/30/2025 1,835  850 - 3,900 cells/uL Final    ABSOLUTE MONOCYTES 06/30/2025 511  200 - 950 cells/uL Final    ABSOLUTE EOSINOPHILS 06/30/2025 178  15 - 500 cells/uL Final    ABSOLUTE BASOPHILS 06/30/2025 37  0 - 200 cells/uL Final    NEUTROPHILS 06/30/2025 65.4  % Final    LYMPHOCYTES 06/30/2025 24.8  % Final    MONOCYTES 06/30/2025 6.9  % Final    EOSINOPHILS 06/30/2025 2.4  % Final    BASOPHILS 06/30/2025 0.5  % Final    GLUCOSE 06/30/2025 85  65 - 139 mg/dL Final    Comment:           Non-fasting reference interval         UREA NITROGEN (BUN) 06/30/2025 20  7 - 25 mg/dL Final    CREATININE 06/30/2025 0.63  0.50 - 0.96 mg/dL Final    EGFR 06/30/2025 123  > OR = 60 mL/min/1.73m2 Final    SODIUM 06/30/2025 137  135 - 146 mmol/L Final    POTASSIUM 06/30/2025 4.3  3.5 - 5.3 mmol/L Final    CHLORIDE 06/30/2025 102  98 - 110 mmol/L Final    CARBON DIOXIDE 06/30/2025 28  20 - 32 mmol/L Final    ELECTROLYTE BALANCE 06/30/2025 7  7 - 17 mmol/L (calc) Final    CALCIUM 06/30/2025 9.2  8.6 - 10.2 mg/dL Final    PROTEIN, TOTAL 06/30/2025 6.9  6.1 - 8.1 g/dL Final    ALBUMIN 06/30/2025 4.2  3.6 - 5.1 g/dL Final    BILIRUBIN, TOTAL 06/30/2025 0.2  0.2 - 1.2 mg/dL Final    ALKALINE PHOSPHATASE 06/30/2025 51  31 - 125 U/L Final    AST 06/30/2025 17  10 - 30 U/L Final    ALT 06/30/2025 17  6 - 29 U/L Final    CHOLESTEROL, TOTAL 06/30/2025 175  <200 mg/dL Final    HDL CHOLESTEROL 06/30/2025 42 (L)  > OR = 50 mg/dL Final    TRIGLYCERIDES  06/30/2025 201 (H)  <150 mg/dL Final    Comment:    If a non-fasting specimen was collected, consider  repeat triglyceride testing on a fasting specimen  if clinically indicated.   Juels et al. J. of Clin. Lipidol. 2015;9:129-169.         LDL-CHOLESTEROL 06/30/2025 101 (H)  mg/dL (calc) Final    Comment: Reference range: <100     Desirable range <100 mg/dL for primary prevention;    <70 mg/dL for patients with CHD or diabetic patients   with > or = 2 CHD risk factors.     LDL-C is now calculated using the Junior-Reis   calculation, which is a validated novel method providing   better accuracy than the Friedewald equation in the   estimation of LDL-C.   Junior SS et al. COLLEEN. 2013;310(19): 5390-7815   (http://education."[employer]/faq/LYN430)      CHOL/HDLC RATIO 06/30/2025 4.2  <5.0 (calc) Final    NON HDL CHOLESTEROL 06/30/2025 133 (H)  <130 mg/dL (calc) Final    Comment: For patients with diabetes plus 1 major ASCVD risk   factor, treating to a non-HDL-C goal of <100 mg/dL   (LDL-C of <70 mg/dL) is considered a therapeutic   option.      TSH W/REFLEX TO FT4 06/30/2025 1.69  mIU/L Final    Comment:           Reference Range                         > or = 20 Years  0.40-4.50                              Pregnancy Ranges            First trimester    0.26-2.66            Second trimester   0.55-2.73            Third trimester    0.43-2.91      VITAMIN D,25-OH,TOTAL,IA 06/30/2025 43  30 - 100 ng/mL Final    Comment: Vitamin D Status         25-OH Vitamin D:     Deficiency:                    <20 ng/mL  Insufficiency:             20 - 29 ng/mL  Optimal:                 > or = 30 ng/mL     For 25-OH Vitamin D testing on patients on   D2-supplementation and patients for whom quantitation   of D2 and D3 fractions is required, the QuestAssureD(TM)  25-OH VIT D, (D2,D3), LC/MS/MS is recommended: order   code 60672 (patients >2yrs).     See Note 1     Note 1     For additional information, please refer to    http://education.[employer]/faq/FNK109   (This link is being provided for informational/  educational purposes only.)      VITAMIN B12 06/30/2025 428  200 - 1,100 pg/mL Final    HEMOGLOBIN A1c 06/30/2025 5.5  <5.7 % Final    Comment: For the purpose of screening for the presence of  diabetes:     <5.7%       Consistent with the absence of diabetes  5.7-6.4%    Consistent with increased risk for diabetes              (prediabetes)  > or =6.5%  Consistent with diabetes     This assay result is consistent with a decreased risk  of diabetes.     Currently, no consensus exists regarding use of  hemoglobin A1c for diagnosis of diabetes in children.     According to American Diabetes Association (ADA)  guidelines, hemoglobin A1c <7.0% represents optimal  control in non-pregnant diabetic patients. Different  metrics may apply to specific patient populations.   Standards of Medical Care in Diabetes(ADA).          eAG (mg/dL) 06/30/2025 111  mg/dL Final    eAG (mmol/L) 06/30/2025 6.2  mmol/L Final    T3, FREE 06/30/2025 3.1  2.3 - 4.2 pg/mL Final       Assessment/Plan   Ofe was seen today for follow-up.  Diagnoses and all orders for this visit:  Class 3 severe obesity without serious comorbidity with body mass index (BMI) of 40.0 to 44.9 in adult, unspecified obesity type (Primary)  Generalized anxiety disorder  -     FLUoxetine (PROzac) 20 mg tablet; Take 2 tablets daily            Continue  Prozac to 40 mg daily.    Stop Wellbutrin 100 twice daily     continue diet and exercise follow-up 1 month    I spent 15 minutes on obesity councelling. Pt was advised to loose weight. Discussed at length about various ways of loosing weight including healthy Diet, daily Aerobic exercise, calorie counting bariatric surgery, calorie deficit with portion control method, and medications. recommend patient maintain a diet of  1500calories.             [1]   Social History  Tobacco Use    Smoking status: Never    Smokeless  tobacco: Never   Vaping Use    Vaping status: Never Used   Substance Use Topics    Alcohol use: Never    Drug use: Never   [2]   Allergies  Allergen Reactions    Penicillins Shortness of breath and Rash   [3]   Current Outpatient Medications   Medication Sig Dispense Refill    FLUoxetine (PROzac) 20 mg tablet Take 1 tablet (20 mg) by mouth once daily. 30 tablet 1    magnesium 250 mg tablet Take 1 tablet (250 mg) by mouth once daily.      multivitamin tablet Take 1 tablet by mouth once daily.      acetaminophen (Tylenol) 325 mg tablet Take 3 tablets (975 mg) by mouth every 6 hours. (Patient not taking: Reported on 6/30/2025) 60 tablet 2    buPROPion (Wellbutrin) 100 mg tablet Take 1 tablet (100 mg) by mouth 2 times a day. (Patient not taking: Reported on 7/23/2025) 60 tablet 0    ferrous gluconate 256 mg (28 mg iron) tablet Take 1 tablet (28 mg) by mouth once daily. (Patient not taking: Reported on 6/30/2025)      ibuprofen 600 mg tablet Take 1 tablet (600 mg) by mouth every 6 hours. (Patient not taking: Reported on 6/30/2025) 60 tablet 2    polyethylene glycol (Glycolax, Miralax) 17 gram packet Take 17 g by mouth 2 times a day as needed (first line). (Patient not taking: Reported on 6/30/2025) 3 packet 2     No current facility-administered medications for this visit.

## 2025-07-24 DIAGNOSIS — F41.1 GENERALIZED ANXIETY DISORDER: ICD-10-CM

## 2025-07-24 RX ORDER — BUPROPION HYDROCHLORIDE 100 MG/1
100 TABLET ORAL 2 TIMES DAILY
Qty: 180 TABLET | Refills: 1 | Status: SHIPPED | OUTPATIENT
Start: 2025-07-24 | End: 2025-07-25 | Stop reason: ALTCHOICE

## 2025-07-31 ENCOUNTER — TELEPHONE (OUTPATIENT)
Dept: PRIMARY CARE | Facility: CLINIC | Age: 30
End: 2025-07-31
Payer: MEDICAID

## 2025-07-31 DIAGNOSIS — F41.1 GENERALIZED ANXIETY DISORDER: ICD-10-CM

## 2025-07-31 RX ORDER — FLUOXETINE 20 MG/1
TABLET ORAL
Qty: 180 TABLET | Refills: 1 | Status: SHIPPED | OUTPATIENT
Start: 2025-07-31

## 2025-08-20 ENCOUNTER — APPOINTMENT (OUTPATIENT)
Dept: PRIMARY CARE | Facility: CLINIC | Age: 30
End: 2025-08-20
Payer: MEDICAID

## 2025-09-17 ENCOUNTER — APPOINTMENT (OUTPATIENT)
Dept: PRIMARY CARE | Facility: CLINIC | Age: 30
End: 2025-09-17
Payer: MEDICAID